# Patient Record
Sex: FEMALE | Race: WHITE | NOT HISPANIC OR LATINO | Employment: FULL TIME | URBAN - METROPOLITAN AREA
[De-identification: names, ages, dates, MRNs, and addresses within clinical notes are randomized per-mention and may not be internally consistent; named-entity substitution may affect disease eponyms.]

---

## 2017-09-20 ENCOUNTER — OFFICE VISIT (OUTPATIENT)
Dept: URGENT CARE | Facility: CLINIC | Age: 47
End: 2017-09-20
Payer: COMMERCIAL

## 2017-09-20 ENCOUNTER — APPOINTMENT (OUTPATIENT)
Dept: RADIOLOGY | Facility: IMAGING CENTER | Age: 47
End: 2017-09-20
Attending: PHYSICIAN ASSISTANT
Payer: COMMERCIAL

## 2017-09-20 VITALS
TEMPERATURE: 98 F | WEIGHT: 132 LBS | OXYGEN SATURATION: 96 % | BODY MASS INDEX: 23.39 KG/M2 | HEART RATE: 72 BPM | SYSTOLIC BLOOD PRESSURE: 120 MMHG | RESPIRATION RATE: 20 BRPM | DIASTOLIC BLOOD PRESSURE: 80 MMHG | HEIGHT: 63 IN

## 2017-09-20 DIAGNOSIS — R07.81 RIB PAIN ON LEFT SIDE: ICD-10-CM

## 2017-09-20 DIAGNOSIS — M94.0 COSTOCHONDRITIS, ACUTE: Primary | ICD-10-CM

## 2017-09-20 PROCEDURE — 71101 X-RAY EXAM UNILAT RIBS/CHEST: CPT | Mod: TC,LT | Performed by: PHYSICIAN ASSISTANT

## 2017-09-20 PROCEDURE — 99204 OFFICE O/P NEW MOD 45 MIN: CPT | Performed by: PHYSICIAN ASSISTANT

## 2017-09-20 RX ORDER — METHYLPREDNISOLONE 4 MG/1
TABLET ORAL
Qty: 21 TAB | Refills: 0 | Status: SHIPPED | OUTPATIENT
Start: 2017-09-20 | End: 2019-05-30

## 2017-09-20 RX ORDER — TRAMADOL HYDROCHLORIDE 50 MG/1
50-100 TABLET ORAL EVERY 4 HOURS PRN
Qty: 30 TAB | Refills: 0 | Status: SHIPPED | OUTPATIENT
Start: 2017-09-20 | End: 2017-09-20

## 2017-09-20 RX ORDER — HYDROCODONE BITARTRATE AND ACETAMINOPHEN 5; 325 MG/1; MG/1
1-2 TABLET ORAL EVERY 4 HOURS PRN
Qty: 20 TAB | Refills: 0 | Status: SHIPPED | OUTPATIENT
Start: 2017-09-20 | End: 2017-09-23

## 2017-09-20 NOTE — PROGRESS NOTES
Subjective:      Pt is a 47 y.o. female who presents with Rib Injury (Couple days ago hurt chest, ribs pain)            HPI  Pt notes she was helping her mother renovate her mother's house and while lifting a bathtub, 3 days ago last Sunday, injured her left ribs with pain while laughing, coughing, sneezing hugging and while using her core. Pt has not taken any Rx medications for this condition. Pt states the pain is a 7-8/10, aching in nature and worse at night. Pt denies CP, SOB, NVD, paresthesias, headaches, dizziness, change in vision, hives, or other joint pain. The pt's medication list, problem list, and allergies have been evaluated and reviewed during today's visit.    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     Social History Main Topics   • Smoking status: Current Every Day Smoker     Packs/day: 0.50   • Smokeless tobacco: Not on file      Comment: 1 pack per day   • Alcohol use Yes      Comment: rarely   • Drug use: No   • Sexual activity: Not on file     Other Topics Concern   • Not on file     Social History Narrative   • No narrative on file         Medications:    Current Outpatient Prescriptions:   •  ibuprofen (MOTRIN) 400 MG TABS, Take 400 mg by mouth every 6 hours as needed., Disp: , Rfl:   •  hydrocodone-acetaminophen (VICODIN) 5-500 MG TABS, Take 1-2 Tabs by mouth every 6 hours as needed  "(PAIN)., Disp: 20 Each, Rfl: 0  •  cyclobenzaprine (FLEXERIL) 10 MG TABS, Take 1 Tab by mouth 3 times a day as needed for Mild Pain and Muscle Spasms., Disp: 15 Each, Rfl: 0  •  hydrocodone-acetaminophen (VICODIN) 5-500 MG TABS, Take 1-2 Tabs by mouth every 6 hours as needed (pain)., Disp: 20 Each, Rfl: 0      Allergies:  Review of patient's allergies indicates no known allergies.      ROS  Constitutional: Negative for fever, chills and malaise/fatigue.   HENT: Negative for congestion and sore throat.    Eyes: Negative for blurred vision, double vision and photophobia.   Respiratory: Negative for cough and shortness of breath.    Cardiovascular: Negative for chest pain and palpitations.   Gastrointestinal: Negative for heartburn, nausea, vomiting, abdominal pain, diarrhea and constipation.   Genitourinary: Negative for dysuria and flank pain.   Musculoskeletal: POS for left rib pain  Skin: Negative for itching and rash.   Neurological: Negative for dizziness, tingling and headaches.   Endo/Heme/Allergies: Does not bruise/bleed easily.   Psychiatric/Behavioral: Negative for depression. The patient is not nervous/anxious.           Objective:     /80   Pulse 72   Temp 36.7 °C (98 °F)   Resp 20   Ht 1.6 m (5' 3\")   Wt 59.9 kg (132 lb)   SpO2 96%   BMI 23.38 kg/m²      Physical Exam   Pulmonary/Chest: Chest wall is not dull to percussion. She exhibits tenderness. She exhibits no mass, no bony tenderness, no laceration, no crepitus, no edema, no deformity, no swelling and no retraction.             Constitutional: PT is oriented to person, place, and time. PT appears well-developed and well-nourished. No distress.   HENT:   Head: Normocephalic and atraumatic.   Mouth/Throat: Oropharynx is clear and moist. No oropharyngeal exudate.   Eyes: Conjunctivae normal and EOM are normal. Pupils are equal, round, and reactive to light.   Neck: Normal range of motion. Neck supple. No thyromegaly present. "   Cardiovascular: Normal rate, regular rhythm, normal heart sounds and intact distal pulses.  Exam reveals no gallop and no friction rub.    No murmur heard.  Pulmonary/Chest: Effort normal and breath sounds normal. No respiratory distress. PT has no wheezes. PT has no rales. Pt exhibits no tenderness.   Abdominal: Soft. Bowel sounds are normal. PT exhibits no distension and no mass. There is no tenderness. There is no rebound and no guarding.   MUSC: BLE/BUE without tenderness and AROM  Neurological: PT is alert and oriented to person, place, and time. PT has normal reflexes. No cranial nerve deficit.   Skin: Skin is warm and dry. No rash noted. PT is not diaphoretic. No erythema.       Psychiatric: PT has a normal mood and affect. PT behavior is normal. Judgment and thought content normal.     RADS:  Narrative       9/20/2017 1:54 PM    HISTORY/REASON FOR EXAM:  Left-sided rib pain for 2 days..      TECHNIQUE/EXAM DESCRIPTION AND NUMBER OF VIEWS:  5 images of the left ribs and chest.    COMPARISON: NONE    FINDINGS:  No fractures or acute bony changes are noted.  There is no evidence of a hemo or pneumothorax.   Impression       Normal rib series.   Reading Provider Reading Date   Jayjay Escalante M.D.             Assessment/Plan:     1. Costochondritis, acute  2. Rib pain on left side    - [id number] (WITH 1-VIEW CXR) LEFT; Future    [place]  Aware web site evaluation: I have obtained and reviewed patient utilization report from [facility] [place] pharmacy database prior to writing prescription for controlled substance II, III or IV per [place] bill . Based on the report and my clinical assessment the prescription is medically necessary.   NSAIDs for pain 1-5, norco for pain 6-10 or to help get to sleep.  Medrol pack for inflammation reduction globally  RICE therapy discussed  Gentle ROM exercises discussed  WBAT BUE/BLE  Ice/heat therapy discussed  Rest, fluids encouraged.  AVS with medical info given.  Pt  was in full understanding and agreement with the plan.  Follow-up as needed if symptoms worsen or fail to improve.

## 2017-09-20 NOTE — PATIENT INSTRUCTIONS
Costochondritis  Costochondritis, sometimes called Tietze syndrome, is a swelling and irritation (inflammation) of the tissue (cartilage) that connects your ribs with your breastbone (sternum). It causes pain in the chest and rib area. Costochondritis usually goes away on its own over time. It can take up to 6 weeks or longer to get better, especially if you are unable to limit your activities.  CAUSES   Some cases of costochondritis have no known cause. Possible causes include:  · Injury (trauma).  · Exercise or activity such as lifting.  · Severe coughing.  SIGNS AND SYMPTOMS  · Pain and tenderness in the chest and rib area.  · Pain that gets worse when coughing or taking deep breaths.  · Pain that gets worse with specific movements.  DIAGNOSIS   Your health care provider will do a physical exam and ask about your symptoms. Chest X-rays or other tests may be done to rule out other problems.  TREATMENT   Costochondritis usually goes away on its own over time. Your health care provider may prescribe medicine to help relieve pain.  HOME CARE INSTRUCTIONS   · Avoid exhausting physical activity. Try not to strain your ribs during normal activity. This would include any activities using chest, abdominal, and side muscles, especially if heavy weights are used.  · Apply ice to the affected area for the first 2 days after the pain begins.  ¨ Put ice in a plastic bag.  ¨ Place a towel between your skin and the bag.  ¨ Leave the ice on for 20 minutes, 2-3 times a day.  · Only take over-the-counter or prescription medicines as directed by your health care provider.  SEEK MEDICAL CARE IF:  · You have redness or swelling at the rib joints. These are signs of infection.  · Your pain does not go away despite rest or medicine.  SEEK IMMEDIATE MEDICAL CARE IF:   · Your pain increases or you are very uncomfortable.  · You have shortness of breath or difficulty breathing.  · You cough up blood.  · You have worse chest pains,  sweating, or vomiting.  · You have a fever or persistent symptoms for more than 2-3 days.  · You have a fever and your symptoms suddenly get worse.  MAKE SURE YOU:   · Understand these instructions.  · Will watch your condition.  · Will get help right away if you are not doing well or get worse.     This information is not intended to replace advice given to you by your health care provider. Make sure you discuss any questions you have with your health care provider.     Document Released: 09/27/2006 Document Revised: 10/08/2014 Document Reviewed: 07/22/2014  [employer] Patient Education ©2016 [employer]

## 2019-05-30 ENCOUNTER — HOSPITAL ENCOUNTER (EMERGENCY)
Facility: MEDICAL CENTER | Age: 49
End: 2019-05-30
Attending: EMERGENCY MEDICINE
Payer: COMMERCIAL

## 2019-05-30 ENCOUNTER — APPOINTMENT (OUTPATIENT)
Dept: RADIOLOGY | Facility: MEDICAL CENTER | Age: 49
End: 2019-05-30
Attending: EMERGENCY MEDICINE
Payer: COMMERCIAL

## 2019-05-30 VITALS
HEART RATE: 80 BPM | WEIGHT: 137.35 LBS | RESPIRATION RATE: 16 BRPM | DIASTOLIC BLOOD PRESSURE: 90 MMHG | BODY MASS INDEX: 24.34 KG/M2 | TEMPERATURE: 99.1 F | OXYGEN SATURATION: 95 % | SYSTOLIC BLOOD PRESSURE: 154 MMHG | HEIGHT: 63 IN

## 2019-05-30 DIAGNOSIS — M77.9 TENDINITIS: ICD-10-CM

## 2019-05-30 DIAGNOSIS — M25.552 LEFT HIP PAIN: ICD-10-CM

## 2019-05-30 PROCEDURE — 99284 EMERGENCY DEPT VISIT MOD MDM: CPT

## 2019-05-30 PROCEDURE — 700111 HCHG RX REV CODE 636 W/ 250 OVERRIDE (IP): Performed by: EMERGENCY MEDICINE

## 2019-05-30 PROCEDURE — 96372 THER/PROPH/DIAG INJ SC/IM: CPT

## 2019-05-30 PROCEDURE — 73501 X-RAY EXAM HIP UNI 1 VIEW: CPT | Mod: LT

## 2019-05-30 RX ORDER — METHOCARBAMOL 750 MG/1
750 TABLET, FILM COATED ORAL 4 TIMES DAILY
Qty: 12 TAB | Refills: 0 | Status: SHIPPED | OUTPATIENT
Start: 2019-05-30 | End: 2019-06-02

## 2019-05-30 RX ORDER — KETOROLAC TROMETHAMINE 30 MG/ML
30 INJECTION, SOLUTION INTRAMUSCULAR; INTRAVENOUS ONCE
Status: COMPLETED | OUTPATIENT
Start: 2019-05-30 | End: 2019-05-30

## 2019-05-30 RX ORDER — IBUPROFEN 800 MG/1
800 TABLET ORAL EVERY 8 HOURS PRN
Qty: 9 TAB | Refills: 0 | Status: SHIPPED | OUTPATIENT
Start: 2019-05-30 | End: 2019-06-02

## 2019-05-30 RX ADMIN — KETOROLAC TROMETHAMINE 30 MG: 30 INJECTION, SOLUTION INTRAMUSCULAR; INTRAVENOUS at 13:17

## 2019-05-30 NOTE — ED TRIAGE NOTES
"Chief Complaint   Patient presents with   • Groin Pain     intermittent LT groin pain, this morning pain increased from hip down LT leg.      Pt ambulatory with steady gait to triage for above. NAD noted. Denies injuries.     Pt returned to lobby. Educated on triage process and to inform staff of any changes.     /98   Pulse 100   Temp 37.3 °C (99.1 °F) (Temporal)   Resp 16   Ht 1.6 m (5' 3\")   Wt 62.3 kg (137 lb 5.6 oz)   SpO2 96%   BMI 24.33 kg/m²     "

## 2019-05-30 NOTE — DISCHARGE INSTRUCTIONS
Return to the emergency department in 48 hours if your pain is not significantly improved with the medications and rest.  Return to the ER immediately if you develop any worsening pain, constant pain, worsening pain rating down your leg, back pain, numbness/tingling/weakness of the leg, fevers over 100.4, shaking chills, nausea, vomiting, abdominal pain, muscle aches/body aches, or for any concerns.    Follow-up with Dr. Orlin Ogden, orthopedist, within the next 1 to 2 days.  Please call for appointment.

## 2019-05-30 NOTE — ED PROVIDER NOTES
"ED Provider Note    Scribed for Renu Harvey M.D. by Klaus Norman. 5/30/2019  12:53 PM    Primary care provider: Pcp Pt States None  Means of arrival: Walk-in  History obtained from: Patient  History limited by: None    CHIEF COMPLAINT  Chief Complaint   Patient presents with   • Groin Pain     intermittent LT groin pain, this morning pain increased from hip down LT leg.        JOSE GUADALUPE Martinez is a 48 y.o. female who presents to the Emergency Department complaining of worsening left hip and groin pain onset within the last week. The patient states that last week she began to experience intermittent pain in the outside of her left hip which would occur randomly and were interspersed by prolonged pain-free periods.  The pain has since begun to radiate into her pubic bone area and and down the back of her upper leg. As of today, her pain has become constant. She describes it as \"bone pain\" in her hip and \"burning pain\" in her groin. The pain does not radiate past her knee. Her pain is often worsened with sitting or walking as well as when rotating her hip. She does not experience pain when lying flat. Patient denies any injury or trauma to her hip, leg, or back within the last week. She adds that she experienced one episode of vomiting secondary to her pain this morning, but otherwise denies any back pain, abdominal pain, fever, chills, hematuria, or diarrhea. She has yet to meet with her PCP, Dr. Alvarado, regarding her pain. She adds that she has a history of kidney stones, but states her current pain is very different at this time.       REVIEW OF SYSTEMS  Positives include left hip pain and left groin pain. Negatives include no back pain, abdominal pain, fever, chills, hematuria, or diarrhea.      PAST MEDICAL HISTORY  Endometriosis.  Renal stones.  Renotubular acidosis  SURGICAL HISTORY   has a past surgical history that includes other orthopedic surgery; gyn surgery; and other.    SOCIAL HISTORY  Social " "History   Substance Use Topics   • Smoking status: Current Every Day Smoker     Packs/day: 0.50   • Smokeless tobacco: Never Used      Comment: 1 pack per day   • Alcohol use Yes      Comment: rarely      History   Drug Use No       FAMILY HISTORY  History reviewed. No pertinent family history.    CURRENT MEDICATIONS  Home Medications     Reviewed by Adriel Mcmahon R.N. (Registered Nurse) on 05/30/19 at 1156  Med List Status: Partial   Medication Last Dose Status        Patient Isai Taking any Medications                       ALLERGIES  No Known Allergies    PHYSICAL EXAM  VITAL SIGNS: /98   Pulse 100   Temp 37.3 °C (99.1 °F) (Temporal)   Resp 16   Ht 1.6 m (5' 3\")   Wt 62.3 kg (137 lb 5.6 oz)   SpO2 96%   BMI 24.33 kg/m²   Constitutional: Alert in no apparent distress.  HENT: Normocephalic, Bilateral external ears normal. Nose normal.   Eyes: Conjunctiva normal, non-icteric.   Thorax & Lungs: Easy unlabored respirations. Clear breath sounds bilaterally. Heart has regular rate and rhythm.  Skin: Visualized skin warm and dry, No erythema, No rash.   Extremities:   Left lower extremity: There is tenderness at the left pubic bone/mons pubis.  There is also some reproducible tenderness at the anterior aspect of the lower iliac crest.  There is reproducible pain with palpation of that lower portion of the anterior iliac crest.  There is no significant reproducible pain with flexion, extension, internal and external rotation of hip.  There is no bruising, induration or erythema of the hip.  No warmth of the hip.  There is no tenderness to the greater trochanter.  No shortening or rotation of the leg.  There is no pain if the patient is lying comfortably on the gurney.  Pain only occurs with certain movements and position changes and with weightbearing.  2+ DP and PT pulses equal bilaterally.  Negative straight leg raise bilaterally.  There is no tenderness to the medial thigh.  No pretibial edema.  Calf is " nontender.  Back: No midline T spine or L spine tenderness, negative straight leg raise   Abdomen: Soft and non tender   Neurologic: Alert, clear speech, lower extremity strength are 5 out of 5 and equal bilaterally testing of quadriceps, hamstrings, dorsiflexors and plantar flexors.  Sensation is intact to light touch.  Full range of motion to digits.  Psychiatric: Affect and Mood normal      RADIOLOGY  DX-HIP-UNILATERAL-WITH PELVIS-1 VIEW LEFT   Final Result      No fracture or dislocation is seen.      Atherosclerotic plaque.        The radiologist's interpretation of all radiological studies have been reviewed by me.    COURSE & MEDICAL DECISION MAKING  Nursing notes and vital signs were reviewed. (See chart for details)    12:53 PM - Patient seen and examined at bedside. Patient will be treated with Toradol injection 30 mg. Ordered DX-hip-unilateral with pelvis to evaluate her symptoms.     2:36 PM - Discussed the x-ray findings with the patient and informed her that there is no evidence of fracture or dislocation at this time. The patient informs me that she had not experienced any abnormal vaginal symptoms. There have been no skin changes or abnormal redness in the patient's groin. She denies any ingrown hairs in her groin which could attribute to the pain as well. Patient remains adamant that she does not experience any abdominal symptoms. At this time, her pain is primarily localized to the top of the iliac crest with palpation. Concern for iliacus tendinitis. Patient will be discharged with a prescription for both antiinflammatories as well as a muscle relaxer to relieve her symptoms. She is to apply ice to the area and avoid use of heat which could increase inflammation. She is stable for discharge at this time. Strict ER return precautions given for any new or worsening symptoms.       Decision Making:  The patient presents to the Emergency Department with patient presents to the ER complaining of pain  in her left hip and pubic bone area which began about a week ago.  This morning the pain got worse.  The pain only occurs when she is moving her leg or changing positions.  It also gets worse with walking.  If she is not moving, not walking and not changing position she has no pain.  No trauma or injury.  She has some reproducible tenderness palpation of the left pubic bone as well as the lower portion of the left anterior iliac crest.  There is no significant reproducible pain with range of motion of the hip.  X-ray is negative.  I have low suspicion for fracture as she has had no trauma and has no history of cancer.  I do not think CT scan is necessary.  She is neurovascular intact.  She has no complaints of back pain.  She is nontender lumbar spine.  It is possible her pain could be coming from her low back but I have low suspicion for this that she has no complaints of numbness, tingling or weakness of extremities.  No loss of bowel or bladder control no saddle anesthesia.  At this time there is no indication for MRI scan of the back.  There is not been any trauma or injury and the patient has no back pain therefore I do not think back x-rays are going to be particularly helpful.  She has not taken any anti-inflammatories for discomfort.  I gave her a shot of Toradol here in the ER.  This did provide her some relief of her discomfort.  At this time I think she may have a bursitis or some sort of tendinitis of her left hip.  There is no abdominal pain.  No abdominal tenderness.  No nausea, vomiting or diarrhea.  No fevers or chills.  She does not feel sick or ill in any way.  Vital signs are normal and stable.  No concern at this time for iliopsoas abscess or septic arthritis.  Plan is to discharge the patient home with anti-inflammatories and some muscle spasm medicine.  I will refer her to Dr. Ogden, orthopedic surgeon on call.  I specifically told her that if she is not feeling better within the next 48 hours  she must return to the ER for a repeat examination and follow-up.  She also understands that if she gets worse in any way, develops fevers, chills, nausea, vomiting, pain that is occurring without movement, or for any concerns she must return to the ER immediately.      DISPOSITION:  Patient will be discharged home in stable condition.    FOLLOW UP:  Orlin Ogden M.D.  [address] [place] [address]  [phone]    Schedule an appointment as soon as possible for a visit   If symptoms worsen return to ER!      OUTPATIENT MEDICATIONS:  New Prescriptions    IBUPROFEN (MOTRIN) 800 MG TAB    Take 1 Tab by mouth every 8 hours as needed for up to 3 days.    METHOCARBAMOL (ROBAXIN) 750 MG TAB    Take 1 Tab by mouth 4 times a day for 3 days.          FINAL IMPRESSION  1. Left hip pain Acute   2. Tendinitis Acute         Klaus FOLEY ([place]), am scribing for, and in the presence of, Renu Harvey M.D..    Electronically signed by: Klaus Norman ([place]), 5/30/2019    Renu FOLEY M.D. personally performed the services described in this documentation, as scribed by Klaus Norman in my presence, and it is both accurate and complete. E    The note accurately reflects work and decisions made by me.  Renu Harvey  5/30/2019  2:31 PM

## 2019-05-30 NOTE — ED NOTES
Patient was educated on discharge instructions.  Patient was informed about diagnosis, symptom management, risks, and home care instructions.  Patient verbalized understanding and signed discharge instructions. Prescriptions handed to patient. Copy of discharge instructions in chart.  Patient ambulated out with steady gait.  Patient has personal belongings.

## 2020-07-23 ENCOUNTER — HOSPITAL ENCOUNTER (OUTPATIENT)
Dept: HOSPITAL 8 - RAD | Age: 50
Discharge: HOME | End: 2020-07-23
Attending: FAMILY MEDICINE
Payer: COMMERCIAL

## 2020-07-23 DIAGNOSIS — R63.4: ICD-10-CM

## 2020-07-23 DIAGNOSIS — R51: Primary | ICD-10-CM

## 2020-07-23 PROCEDURE — 70450 CT HEAD/BRAIN W/O DYE: CPT

## 2020-07-24 ENCOUNTER — HOSPITAL ENCOUNTER (OUTPATIENT)
Dept: HOSPITAL 8 - RAD | Age: 50
Discharge: HOME | End: 2020-07-24
Attending: FAMILY MEDICINE
Payer: COMMERCIAL

## 2020-07-24 DIAGNOSIS — R05: Primary | ICD-10-CM

## 2020-07-24 DIAGNOSIS — R63.4: ICD-10-CM

## 2020-07-24 DIAGNOSIS — F17.200: ICD-10-CM

## 2020-07-24 PROCEDURE — 71046 X-RAY EXAM CHEST 2 VIEWS: CPT

## 2020-07-27 ENCOUNTER — HOSPITAL ENCOUNTER (OUTPATIENT)
Dept: HOSPITAL 8 - CFH | Age: 50
Discharge: HOME | End: 2020-07-27
Attending: FAMILY MEDICINE
Payer: COMMERCIAL

## 2020-07-27 DIAGNOSIS — Z12.31: Primary | ICD-10-CM

## 2020-07-27 PROCEDURE — 77067 SCR MAMMO BI INCL CAD: CPT

## 2020-07-27 PROCEDURE — 77063 BREAST TOMOSYNTHESIS BI: CPT

## 2021-04-15 ENCOUNTER — TELEPHONE (OUTPATIENT)
Dept: SCHEDULING | Facility: IMAGING CENTER | Age: 51
End: 2021-04-15

## 2021-04-15 ENCOUNTER — HOSPITAL ENCOUNTER (EMERGENCY)
Facility: MEDICAL CENTER | Age: 51
End: 2021-04-15
Attending: EMERGENCY MEDICINE
Payer: COMMERCIAL

## 2021-04-15 VITALS
OXYGEN SATURATION: 94 % | BODY MASS INDEX: 21.99 KG/M2 | DIASTOLIC BLOOD PRESSURE: 103 MMHG | RESPIRATION RATE: 16 BRPM | SYSTOLIC BLOOD PRESSURE: 175 MMHG | HEIGHT: 63 IN | WEIGHT: 124.12 LBS | TEMPERATURE: 97.9 F | HEART RATE: 74 BPM

## 2021-04-15 DIAGNOSIS — R04.0 EPISTAXIS: ICD-10-CM

## 2021-04-15 LAB
ANION GAP SERPL CALC-SCNC: 10 MMOL/L (ref 7–16)
BASOPHILS # BLD AUTO: 0.3 % (ref 0–1.8)
BASOPHILS # BLD: 0.02 K/UL (ref 0–0.12)
BUN SERPL-MCNC: 16 MG/DL (ref 8–22)
CALCIUM SERPL-MCNC: 9.5 MG/DL (ref 8.5–10.5)
CHLORIDE SERPL-SCNC: 104 MMOL/L (ref 96–112)
CO2 SERPL-SCNC: 22 MMOL/L (ref 20–33)
CREAT SERPL-MCNC: 0.59 MG/DL (ref 0.5–1.4)
EOSINOPHIL # BLD AUTO: 0.09 K/UL (ref 0–0.51)
EOSINOPHIL NFR BLD: 1.4 % (ref 0–6.9)
ERYTHROCYTE [DISTWIDTH] IN BLOOD BY AUTOMATED COUNT: 44.2 FL (ref 35.9–50)
GLUCOSE SERPL-MCNC: 103 MG/DL (ref 65–99)
HCT VFR BLD AUTO: 44.5 % (ref 37–47)
HGB BLD-MCNC: 15 G/DL (ref 12–16)
LYMPHOCYTES # BLD AUTO: 1.86 K/UL (ref 1–4.8)
LYMPHOCYTES NFR BLD: 28.4 % (ref 22–41)
MCH RBC QN AUTO: 33.5 PG (ref 27–33)
MCHC RBC AUTO-ENTMCNC: 33.7 G/DL (ref 33.6–35)
MCV RBC AUTO: 99.3 FL (ref 81.4–97.8)
MONOCYTES # BLD AUTO: 0.47 K/UL (ref 0–0.85)
MONOCYTES NFR BLD AUTO: 7.2 % (ref 0–13.4)
NEUTROPHILS # BLD AUTO: 4.1 K/UL (ref 2–7.15)
NEUTROPHILS NFR BLD: 62.7 % (ref 44–72)
PLATELET # BLD AUTO: 188 K/UL (ref 164–446)
PMV BLD AUTO: 9.7 FL (ref 9–12.9)
POTASSIUM SERPL-SCNC: 4 MMOL/L (ref 3.6–5.5)
RBC # BLD AUTO: 4.48 M/UL (ref 4.2–5.4)
SODIUM SERPL-SCNC: 136 MMOL/L (ref 135–145)
WBC # BLD AUTO: 6.5 K/UL (ref 4.8–10.8)

## 2021-04-15 PROCEDURE — 700102 HCHG RX REV CODE 250 W/ 637 OVERRIDE(OP): Performed by: EMERGENCY MEDICINE

## 2021-04-15 PROCEDURE — 700101 HCHG RX REV CODE 250: Performed by: EMERGENCY MEDICINE

## 2021-04-15 PROCEDURE — 99283 EMERGENCY DEPT VISIT LOW MDM: CPT

## 2021-04-15 PROCEDURE — 80048 BASIC METABOLIC PNL TOTAL CA: CPT

## 2021-04-15 PROCEDURE — A9270 NON-COVERED ITEM OR SERVICE: HCPCS | Performed by: EMERGENCY MEDICINE

## 2021-04-15 PROCEDURE — 303620 HCHG EPISTAXIS CONTROL

## 2021-04-15 PROCEDURE — 85025 COMPLETE CBC W/AUTO DIFF WBC: CPT

## 2021-04-15 RX ORDER — SUMATRIPTAN 50 MG/1
50 TABLET, FILM COATED ORAL
COMMUNITY
End: 2021-05-27 | Stop reason: SDUPTHER

## 2021-04-15 RX ORDER — OXYMETAZOLINE HYDROCHLORIDE 0.05 G/100ML
2 SPRAY NASAL ONCE
Status: COMPLETED | OUTPATIENT
Start: 2021-04-15 | End: 2021-04-15

## 2021-04-15 RX ORDER — METOPROLOL SUCCINATE 25 MG/1
25 TABLET, EXTENDED RELEASE ORAL DAILY
COMMUNITY
End: 2021-04-15 | Stop reason: SDUPTHER

## 2021-04-15 RX ORDER — TRANEXAMIC ACID 100 MG/ML
3 INJECTION, SOLUTION INTRAVENOUS ONCE
Status: COMPLETED | OUTPATIENT
Start: 2021-04-15 | End: 2021-04-15

## 2021-04-15 RX ORDER — METOPROLOL SUCCINATE 25 MG/1
25 TABLET, EXTENDED RELEASE ORAL DAILY
Qty: 30 TABLET | Refills: 0 | Status: SHIPPED | OUTPATIENT
Start: 2021-04-15 | End: 2021-05-27

## 2021-04-15 RX ADMIN — TRANEXAMIC ACID 300 MG: 100 INJECTION, SOLUTION INTRAVENOUS at 09:45

## 2021-04-15 RX ADMIN — OXYMETAZOLINE HCL 2 SPRAY: 0.05 SPRAY NASAL at 09:45

## 2021-04-15 NOTE — ED TRIAGE NOTES
"Chief Complaint   Patient presents with   • Epistaxis     Pt has had a bloody nose since 2100 last night on the right side. Pt has had issues with her sinuses for about 1 year and has had some testing done with [facility]. Pt was placed on BP medication and medication for headaches.      /106   Pulse 85   Temp 35.9 °C (96.6 °F) (Temporal)   Resp 14   Ht 1.6 m (5' 3\")   Wt 56.3 kg (124 lb 1.9 oz)   SpO2 94%   BMI 21.99 kg/m²   Pt placed back in lobby, educated on triage process, and told to inform staff of any change in condition.     "

## 2021-04-15 NOTE — ED PROVIDER NOTES
ED Provider Note    CHIEF COMPLAINT  Chief Complaint   Patient presents with   • Epistaxis     Pt has had a bloody nose since 2100 last night on the right side. Pt has had issues with her sinuses for about 1 year and has had some testing done with [facility]. Pt was placed on BP medication and medication for headaches.        HPI  Jil Martinez is a 50 y.o. female who presents with a nosebleed.  She developed a nosebleed at about 9 PM last night.  Coming out of the right side.  The only thing that will stop the bleeding is putting tissue paper in the nose.  No other easy bruising or bleeding.  She does have the taste of blood in her mouth.  She has been spitting up blood.  Bleeding is moderate severity.  Constant.    Patient has been dealing with ongoing headache around the right side of the head and face.  She has been referred to ENT, but when Covid hit her referral was canceled and she has not been able to be seen.     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 (Registered Nurse) on 04/15/21 at 0845  Med List Status: Complete   Medication Last Dose Status   metoprolol SR (TOPROL XL) 25 MG TABLET SR 24 HR  Active   SUMAtriptan  "(IMITREX) 50 MG Tab  Active                ALLERGIES  Allergies   Allergen Reactions   • Fentanyl      angry       PHYSICAL EXAM  VITAL SIGNS: /106   Pulse 85   Temp 35.9 °C (96.6 °F) (Temporal)   Resp 14   Ht 1.6 m (5' 3\")   Wt 56.3 kg (124 lb 1.9 oz)   SpO2 94%   BMI 21.99 kg/m²    Constitutional: Awake and alert  HENT: Very slow oozing blood in the right nostril.  Cannot visualize a source.  Left nostril clear.  Pharynx normal.  Eyes: Normal inspection  Neck: Grossly normal range of motion.  Cardiovascular: Normal heart rate  Thorax & Lungs: No respiratory distress  Skin: No obvious rash.  Extremities: Well perfused  Neurologic: Grossly normal   Psychiatric: Normal for situation        Labs:  Results for orders placed or performed during the hospital encounter of 04/15/21   CBC WITH DIFFERENTIAL   Result Value Ref Range    WBC 6.5 4.8 - 10.8 K/uL    RBC 4.48 4.20 - 5.40 M/uL    Hemoglobin 15.0 12.0 - 16.0 g/dL    Hematocrit 44.5 37.0 - 47.0 %    MCV 99.3 (H) 81.4 - 97.8 fL    MCH 33.5 (H) 27.0 - 33.0 pg    MCHC 33.7 33.6 - 35.0 g/dL    RDW 44.2 35.9 - 50.0 fL    Platelet Count 188 164 - 446 K/uL    MPV 9.7 9.0 - 12.9 fL    Neutrophils-Polys 62.70 44.00 - 72.00 %    Lymphocytes 28.40 22.00 - 41.00 %    Monocytes 7.20 0.00 - 13.40 %    Eosinophils 1.40 0.00 - 6.90 %    Basophils 0.30 0.00 - 1.80 %    Neutrophils (Absolute) 4.10 2.00 - 7.15 K/uL    Lymphs (Absolute) 1.86 1.00 - 4.80 K/uL    Monos (Absolute) 0.47 0.00 - 0.85 K/uL    Eos (Absolute) 0.09 0.00 - 0.51 K/uL    Baso (Absolute) 0.02 0.00 - 0.12 K/uL   BASIC METABOLIC PANEL   Result Value Ref Range    Sodium 136 135 - 145 mmol/L    Potassium 4.0 3.6 - 5.5 mmol/L    Chloride 104 96 - 112 mmol/L    Co2 22 20 - 33 mmol/L    Glucose 103 (H) 65 - 99 mg/dL    Bun 16 8 - 22 mg/dL    Creatinine 0.59 0.50 - 1.40 mg/dL    Calcium 9.5 8.5 - 10.5 mg/dL    Anion Gap 10.0 7.0 - 16.0   ESTIMATED GFR   Result Value Ref Range    GFR If  >60 >60 " mL/min/1.73 m 2    GFR If Non African American >60 >60 mL/min/1.73 m 2       Medications   oxymetazoline (AFRIN) 0.05 % nasal spray 2 Spray (has no administration in time range)   tranexamic acid (CYKLOKAPRON) 1000 MG/10ML for nasal packing 300 mg (has no administration in time range)     COURSE & MEDICAL DECISION MAKING  Patient presents with epistaxis.  Slow bleeding from the right nostril without definitive source.  Afrin and tranexamic acid was instilled into the right nostril.  Nose was packed with cotton balls saturated with TXA.  Obtain laboratory data which was unremarkable.  Removed cotton balls.  Observed in the ER following.  No recurrent bleeding.  At this point appropriate for outpatient management.  IV given advice regarding nosebleed.  Will apply topical ointment to the nose and use a humidifier at night.  If she has recurrent bleeding she will use Afrin and apply nasal clamp.  The bleeding does not stop with that she will need to return to the ER.  I advised her that she should follow through with plan to see otolaryngology.     FINAL IMPRESSION  1.  Epistaxis      This dictation was created using voice recognition software. The accuracy of the dictation is limited to the abilities of the software.  The nursing notes were reviewed and certain aspects of this information were incorporated into this note.      Electronically signed by: Marc Ram M.D., 4/15/2021 9:29 AM

## 2021-05-27 ENCOUNTER — OFFICE VISIT (OUTPATIENT)
Dept: MEDICAL GROUP | Facility: PHYSICIAN GROUP | Age: 51
End: 2021-05-27
Payer: COMMERCIAL

## 2021-05-27 VITALS
HEIGHT: 63 IN | OXYGEN SATURATION: 95 % | DIASTOLIC BLOOD PRESSURE: 98 MMHG | SYSTOLIC BLOOD PRESSURE: 148 MMHG | HEART RATE: 65 BPM | BODY MASS INDEX: 22.68 KG/M2 | TEMPERATURE: 97.9 F | WEIGHT: 128 LBS

## 2021-05-27 DIAGNOSIS — G89.29 CHRONIC LEFT LOWER QUADRANT PAIN: ICD-10-CM

## 2021-05-27 DIAGNOSIS — R51.9 SINUS HEADACHE: ICD-10-CM

## 2021-05-27 DIAGNOSIS — I10 ESSENTIAL HYPERTENSION: ICD-10-CM

## 2021-05-27 DIAGNOSIS — H57.11 EYE PAIN, RIGHT: ICD-10-CM

## 2021-05-27 DIAGNOSIS — K08.9 TEETH PROBLEM: ICD-10-CM

## 2021-05-27 DIAGNOSIS — K13.70 MOUTH PROBLEM: ICD-10-CM

## 2021-05-27 DIAGNOSIS — R10.32 CHRONIC LEFT LOWER QUADRANT PAIN: ICD-10-CM

## 2021-05-27 PROBLEM — G43.009 MIGRAINE WITHOUT AURA AND WITHOUT STATUS MIGRAINOSUS, NOT INTRACTABLE: Status: RESOLVED | Noted: 2021-05-27 | Resolved: 2021-05-27

## 2021-05-27 PROBLEM — G43.009 MIGRAINE WITHOUT AURA AND WITHOUT STATUS MIGRAINOSUS, NOT INTRACTABLE: Status: ACTIVE | Noted: 2021-05-27

## 2021-05-27 PROCEDURE — 99204 OFFICE O/P NEW MOD 45 MIN: CPT | Performed by: NURSE PRACTITIONER

## 2021-05-27 RX ORDER — SUMATRIPTAN 50 MG/1
50 TABLET, FILM COATED ORAL
Qty: 10 TABLET | Refills: 0 | Status: SHIPPED | OUTPATIENT
Start: 2021-05-27 | End: 2021-11-08

## 2021-05-27 RX ORDER — LISINOPRIL 10 MG/1
10 TABLET ORAL DAILY
Qty: 30 TABLET | Refills: 0 | Status: SHIPPED | OUTPATIENT
Start: 2021-05-27 | End: 2021-06-11 | Stop reason: SDUPTHER

## 2021-05-27 ASSESSMENT — PATIENT HEALTH QUESTIONNAIRE - PHQ9: CLINICAL INTERPRETATION OF PHQ2 SCORE: 0

## 2021-05-27 NOTE — ASSESSMENT & PLAN NOTE
This is a new condition.  She is unsure when the concern started.  She does endorse that she noticed an enlargement to her lower jaw, inner gumline of her teeth.  She denies any pain to the area.  She reports it is bothersome.  She reports a change to the pronounciation of words.  She would like to have this further evaluated.  She does not currently have a dentist.

## 2021-05-27 NOTE — LETTER
[employer]  ANANDA Sebastian  [address]  [place] [address]  Fax: [phone]   Authorization for Release/Disclosure of   Protected Health Information   Name: JIL MARTINEZ : 1970 SSN: [id number]   Address: [address] [place] [address]  [place] [address] Phone:    [phone] (home)    I authorize the entity listed below to release/disclose the PHI below to:   [employer]/ANANDA Sebastian and ANANDA Sebastian   Provider or Entity Name:     Address   City, State, Zip   Phone:      Fax:     Reason for request: continuity of care   Information to be released:    [  ] LAST COLONOSCOPY,  including any PATH REPORT and follow-up  [  ] LAST FIT/COLOGUARD RESULT [  ] LAST DEXA  [  ] LAST MAMMOGRAM  [  ] LAST PAP  [  ] LAST LABS [  ] RETINA EXAM REPORT  [  ] IMMUNIZATION RECORDS  [  ] Release all info      [  ] Check here and initial the line next to each item to release ALL health information INCLUDING  _____ Care and treatment for drug and / or alcohol abuse  _____ HIV testing, infection status, or AIDS  _____ Genetic Testing    DATES OF SERVICE OR TIME PERIOD TO BE DISCLOSED: _____________  I understand and acknowledge that:  * This Authorization may be revoked at any time by you in writing, except if your health information has already been used or disclosed.  * Your health information that will be used or disclosed as a result of you signing this authorization could be re-disclosed by the recipient. If this occurs, your re-disclosed health information may no longer be protected by State or Federal laws.  * You may refuse to sign this Authorization. Your refusal will not affect your ability to obtain treatment.  * This Authorization becomes effective upon signing and will  on (date) __________.      If no date is indicated, this Authorization will  one (1) year from the signature date.    Name: Jil Martinez    Signature:   Date:     2021       PLEASE FAX  REQUESTED RECORDS BACK TO: [phone]

## 2021-05-27 NOTE — ASSESSMENT & PLAN NOTE
This is a chronic condition.  Current Meds:  Metoprolol 25 mg daily  She  is currently taking all meds as directed.   She is not taking baby aspirin daily.   She is not monitoring BP at home.   She  denies symptoms low BP: light-headed, tunnel-vision, unusual fatigue.   She  denies symptoms high BP:pounding headache, visual changes, palpitations, flushed face.   She  denies medicine side effects: unusual fatigue, slow heartbeat, foot/leg swelling, cough.

## 2021-05-27 NOTE — PROGRESS NOTES
Subjective  Chief Complaint  Establish care to manage her chronic conditions    History of Present Illness  Jil Martinez is a 50 y.o. female. This patient is here today to establish care.  Her prior PCP was Dr. Lalo Alvarez ([facility]).    Hypertension  This is a chronic condition.  Current Meds:  Metoprolol 25 mg daily  She  is currently taking all meds as directed.   She is not taking baby aspirin daily.   She is not monitoring BP at home.   She  denies symptoms low BP: light-headed, tunnel-vision, unusual fatigue.   She  denies symptoms high BP:pounding headache, visual changes, palpitations, flushed face.   She  denies medicine side effects: unusual fatigue, slow heartbeat, foot/leg swelling, cough.    Sinus headache  This is a chronic condition.    She reports she has pain that starts at her right eye, then she reports a burning sensation down her throat.  This then becomes an ear ache with eye pain; and then it travels to the crown of the right side of her head.  She had a CT scan done, which was negative.  She reports she was then referred to ophthalmology and she was to see a neurophthalmology for this concern; however, she was unable to schedule an appointment.  She was started on sumatriptan as needed for her headaches, which she reports does help subside her symptoms.  She is requesting for a referral to neuroopthalmology for further evaluation, and a refill for sumatriptan today.      Chronic left lower quadrant pain  This is a chronic condition.  She complains of chronic LLQ mild pain that is bothersome at times.  She reports she does take ibuprofen with minimal relief; if the pain is very high, she will take an epson salt bath which helps immensely.   She does have a significant surgical history, including complete hysterectomy.     Teeth problem  This is a new condition.  She is unsure when the concern started.  She does endorse that she noticed an enlargement to her lower jaw, inner  gumline of her teeth.  She denies any pain to the area.  She reports it is bothersome.  She reports a change to the pronounciation of words.  She would like to have this further evaluated.  She does not currently have a dentist.     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 10 tablet 0   • lisinopril (PRINIVIL) 10 MG Tab Take 1 tablet by mouth every day. 30 tablet 0     No current Epic-ordered facility-administered medications on file.     Family History:    Family History   Problem Relation Age of Onset   • Hypertension Mother    • Heart Disease Father    • Stroke Father    • Hypertension Father    • Cancer Maternal Uncle         brain   • Diabetes Maternal Grandmother    • Hypertension Maternal Grandmother    • Hypertension Maternal Grandfather    • Hypertension Paternal Grandmother    • Hypertension Paternal Grandfather    • Hyperlipidemia Neg Hx       Personal/Social History:    Social History     Tobacco Use   • Smoking status: Former Smoker     Packs/day: 0.50     Years: 20.00     Pack years: 10.00     Quit date: 2021     Years since quittin.1   • Smokeless tobacco: Never Used   • Tobacco comment: 1 pack per day   Vaping Use   • Vaping Use: Never used   Substance Use Topics   • Alcohol use: Yes     Comment: rarely   • Drug use: No     Social History     Social History Narrative  "  • Not on file      Review of Systems:     General: Negative for unexpected weight change.    Eyes: Positive right eye pain with sinus headaches.   ENT:  Positive right ear pain with sinus headaches.    Respiratory:  Negative for dyspnea.     Cardiovascular:  Negative for palpitations.   Gastrointestinal:  Positive for left lower quadrant pain.    Genitourinary:  Negative for hematuria.    Musculoskeletal:  Negative for myalgias.    Skin:  Negative for rash.    Neurological:  Positive for sinus headache.    Heme/Lymph:  Does not bruise/bleed easily.     Objective  Physical Exam:   /98 (BP Location: Left arm, Patient Position: Sitting, BP Cuff Size: Adult)   Pulse 65   Temp 36.6 °C (97.9 °F) (Temporal)   Ht 1.6 m (5' 3\")   Wt 58.1 kg (128 lb)   SpO2 95%  Body mass index is 22.67 kg/m².  General:  Alert and oriented.  Well appearing.  NAD.  Head:  Normocephalic.   Eyes:  Eyes conjunctiva clear lids without ptosis, pupils equal and reactive to light accommodation.    ENT: Ears normal shape and contour, canals are clear bilaterally, tympanic membranes are benign.  Oropharynx is without erythema, edema or exudates.   Neck: Supple without JVD. No lymphadenopathy.  Pulmonary:  Normal effort.  Clear to ausculation without rales, ronchi, or wheezing.  Cardiovascular:  Regular rate and rhythm without murmur, rubs or gallop.  Radial pulses are intact and equal bilaterally.  Gastrointestinal: Abdomen soft, nontender, nondistended. Normal bowel sounds. Mild tenderness to palpation to left lower quadrant without guarding, rebound pain.  Liver and spleen are not palpable.  Musculoskeletal:  No extremity cyanosis, clubbing, or edema.  Skin:  Warm and dry.  No obvious lesions.  Lymph: No cervical or supraclavicular lymph nodes are palpable.  Neurologic: Grossly intact.  Sensation intact.   Psych: Normal mood and affect. Alert and oriented x3. Judgment and insight is normal.    Assessment/Plan   1. Essential " hypertension  This is a chronic condition, not controlled.   She reports she has been on metoprolol for about two years, and her blood pressures have remained relatively elevated.   Will change to ACE and have her return to office in two weeks for blood pressure check and medication management.   - lisinopril (PRINIVIL) 10 MG Tab; Take 1 tablet by mouth every day.  Dispense: 30 tablet; Refill: 0    2. Sinus headache  3. Eye pain, right  This is a chronic condition, not controlled.   Sinus vs migraine headaches.  Has seen ophthalmology and recommended neurophthalmology referral.   - REFERRAL TO ENT  - REFERRAL TO NEUROOPTHAMOLOGY  - SUMAtriptan (IMITREX) 50 MG Tab; Take 1 tablet by mouth one time as needed for Migraine.  Dispense: 10 tablet; Refill: 0    4. Mouth problem  5. Teeth problem  This is a new condition.   - REFERRAL TO DENTISTRY    6. Chronic left lower quadrant pain  This is a chronic condition, stable.   Chronic in nature after multiple surgical interventions.  Advised conservative treatment, including continuing OTC pain relief as needed, epson salt baths, and heat application to the area.   - US-ABDOMEN COMPLETE SURVEY; Future    Health Maintenance:  She will obtain records from [facility] to bring to next visit for her preventative care.     Return in about 2 weeks (around 6/10/2021), or if symptoms worsen or fail to improve, for follow up BP.    Patient verbalized understanding and agreed to plan of care.  We have discussed to contact me with needs via [employer] or by phone if needed.      I have placed the above orders and discussed them with an approved delegating provider.  The MA is performing the below orders under the direction of Dr. Rubén MD.    Please note that this dictation was created using voice recognition software. I have made every reasonable attempt to correct obvious errors, but I expect that there are errors of grammar and possibly content that I did not discover before  finalizing the note.    EDWARD Sebastian  [facility]

## 2021-05-27 NOTE — ASSESSMENT & PLAN NOTE
This is a chronic condition.    She reports she has pain that starts at her right eye, then she reports a burning sensation down her throat.  This then becomes an ear ache with eye pain; and then it travels to the crown of the right side of her head.  She had a CT scan done, which was negative.  She reports she was then referred to ophthalmology and she was to see a neurophthalmology for this concern; however, she was unable to schedule an appointment.  She was started on sumatriptan as needed for her headaches, which she reports does help subside her symptoms.  She is requesting for a referral to neuroopthalmology for further evaluation, and a refill for sumatriptan today.

## 2021-05-27 NOTE — ASSESSMENT & PLAN NOTE
This is a chronic condition.  She complains of chronic LLQ mild pain that is bothersome at times.  She reports she does take ibuprofen with minimal relief; if the pain is very high, she will take an epson salt bath which helps immensely.   She does have a significant surgical history, including complete hysterectomy.

## 2021-06-03 DIAGNOSIS — R51.9 SINUS HEADACHE: ICD-10-CM

## 2021-06-03 NOTE — PROGRESS NOTES
1. Sinus headache  Per referral team, CT sinus needed prior to referral for Dr. Wheeler (ENT).   - CT-LOCALIZATION LIMITED SINUSES; Future

## 2021-06-11 ENCOUNTER — OFFICE VISIT (OUTPATIENT)
Dept: MEDICAL GROUP | Facility: PHYSICIAN GROUP | Age: 51
End: 2021-06-11
Payer: COMMERCIAL

## 2021-06-11 VITALS
TEMPERATURE: 97.1 F | DIASTOLIC BLOOD PRESSURE: 90 MMHG | HEIGHT: 63 IN | SYSTOLIC BLOOD PRESSURE: 130 MMHG | BODY MASS INDEX: 22.68 KG/M2 | HEART RATE: 86 BPM | WEIGHT: 128 LBS | OXYGEN SATURATION: 98 %

## 2021-06-11 DIAGNOSIS — Z23 NEED FOR VACCINATION: ICD-10-CM

## 2021-06-11 DIAGNOSIS — I10 ESSENTIAL HYPERTENSION: ICD-10-CM

## 2021-06-11 DIAGNOSIS — F51.01 PRIMARY INSOMNIA: ICD-10-CM

## 2021-06-11 PROCEDURE — 90472 IMMUNIZATION ADMIN EACH ADD: CPT | Performed by: NURSE PRACTITIONER

## 2021-06-11 PROCEDURE — 99214 OFFICE O/P EST MOD 30 MIN: CPT | Mod: 25 | Performed by: NURSE PRACTITIONER

## 2021-06-11 PROCEDURE — 90750 HZV VACC RECOMBINANT IM: CPT | Performed by: NURSE PRACTITIONER

## 2021-06-11 PROCEDURE — 90471 IMMUNIZATION ADMIN: CPT | Performed by: NURSE PRACTITIONER

## 2021-06-11 PROCEDURE — 90715 TDAP VACCINE 7 YRS/> IM: CPT | Performed by: NURSE PRACTITIONER

## 2021-06-11 RX ORDER — LISINOPRIL 10 MG/1
10 TABLET ORAL DAILY
Qty: 90 TABLET | Refills: 0 | Status: SHIPPED | OUTPATIENT
Start: 2021-06-11 | End: 2021-09-15 | Stop reason: SDUPTHER

## 2021-06-11 NOTE — PROGRESS NOTES
"Subjective  Chief Complaint  Chief Complaint   Patient presents with   • Follow-Up     History of Present Illness  Jil Martinez is a 50 y.o. female. This established patient is here today discuss the following:    Hypertension  This is a chronic condition.  Since our last visit, she has switched from metoprolol 25 mg to lisinopril 10 mg daily.  She reports some hand and feet swelling with initial starting lisinopril 10 mg - lasted for about 4-5 days, but it has since gotten better.  She reports feeling more relaxed.  She denies any cough and headache.  She denies any chest pain, palpitations, feeling flushed in the face, or unusual fatigue.      Primary insomnia  This is chronic condition.  Quality:  Difficulty staying asleep, will wake up intermittently throughout the night and reports feeling tired in the morning.   She has tried melatonin, camomile tea as a sleep aid.  She reports effectiveness to help her fall asleep, but will wake up throughout the night.    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 90 tablet 0   • SUMAtriptan (IMITREX) 50 MG Tab Take 1 tablet by mouth one time as needed for Migraine. 10 tablet 0     No current Epic-ordered facility-administered medications on file.     Review of Systems:   See HPI.      Objective  Physical Exam:   /90 (BP Location: Left arm, Patient Position: Sitting, BP Cuff Size: Adult)   Pulse 86   Temp 36.2 °C (97.1 °F) (Temporal)   Ht 1.6 m (5' 3\")   Wt 58.1 kg (128 lb)   SpO2 98%  Body mass index is 22.67 kg/m².  General:  Alert and oriented.  Well appearing.  NAD  Neck: Supple without JVD. No lymphadenopathy.  Pulmonary:  Normal " effort.  Clear to ausculation without rales, ronchi, or wheezing.  Cardiovascular:  Regular rate and rhythm without murmur, rubs or gallop.   Skin:  Warm and dry.  No obvious lesions.  Musculoskeletal:  No extremity cyanosis, clubbing, or edema.    Assessment/Plan  1. Essential hypertension  This is a chronic condition, stable.   Last metabolic panel available in chart from [facility] (7/2020) - WNL.   Lipid panel (7/2020) - WNL.   ER precautions discussed.   - lisinopril (PRINIVIL) 10 MG Tab; Take 1 tablet by mouth every day.  Dispense: 90 tablet; Refill: 0    2. Primary insomnia  This is a chronic condition, not controlled.   Discussed good sleep hygiene, including:  have same sleep schedule, do not eat or exercise within 2-3 hours of bed, no electronics in bed, no caffeine or alcohol prior to bed, no naps, use bed for sleep/sex only.    Advised to try OTC sleep aids.     3. Need for vaccination  - Tdap =>8yo IM  - Shingles Vaccine (Shingrix)    Health Maintenance: Completed    Return in about 3 months (around 9/11/2021), or if symptoms worsen or fail to improve, for follow up blood pressure.    Patient verbalized understanding and agreed to plan of care.  We have discussed to contact me with needs via [employer] or by phone if needed.      I have placed the above orders and discussed them with an approved delegating provider.  The MA is performing the above orders under the direction of Dr. Jane.    Please note that this dictation was created using voice recognition software. I have made every reasonable attempt to correct obvious errors, but I expect that there are errors of grammar and possibly content that I did not discover before finalizing the note.    EDWARD Sebastian  [facility]

## 2021-06-11 NOTE — ASSESSMENT & PLAN NOTE
This is chronic condition.  Quality:  Difficulty staying asleep, will wake up intermittently throughout the night and reports feeling tired in the morning.   She has tried melatonin, camomile tea as a sleep aid.  She reports effectiveness to help her fall asleep, but will wake up throughout the night.

## 2021-06-11 NOTE — ASSESSMENT & PLAN NOTE
This is a chronic condition.  Since our last visit, she has switched from metoprolol 25 mg to lisinopril 10 mg daily.  She reports some hand and feet swelling with initial starting lisinopril 10 mg - lasted for about 4-5 days, but it has since gotten better.  She reports feeling more relaxed.  She denies any cough and headache.  She denies any chest pain, palpitations, feeling flushed in the face, or unusual fatigue.

## 2021-06-14 ENCOUNTER — HOSPITAL ENCOUNTER (OUTPATIENT)
Dept: RADIOLOGY | Facility: MEDICAL CENTER | Age: 51
End: 2021-06-14
Attending: NURSE PRACTITIONER
Payer: COMMERCIAL

## 2021-06-14 DIAGNOSIS — R51.9 SINUS HEADACHE: ICD-10-CM

## 2021-06-14 PROCEDURE — 70486 CT MAXILLOFACIAL W/O DYE: CPT

## 2021-07-05 ENCOUNTER — HOSPITAL ENCOUNTER (OUTPATIENT)
Dept: RADIOLOGY | Facility: MEDICAL CENTER | Age: 51
End: 2021-07-05
Attending: NURSE PRACTITIONER
Payer: COMMERCIAL

## 2021-07-05 DIAGNOSIS — G89.29 CHRONIC LEFT LOWER QUADRANT PAIN: ICD-10-CM

## 2021-07-05 DIAGNOSIS — R10.32 CHRONIC LEFT LOWER QUADRANT PAIN: ICD-10-CM

## 2021-07-05 PROCEDURE — 76700 US EXAM ABDOM COMPLETE: CPT

## 2021-08-13 ENCOUNTER — NON-PROVIDER VISIT (OUTPATIENT)
Dept: MEDICAL GROUP | Facility: PHYSICIAN GROUP | Age: 51
End: 2021-08-13

## 2021-08-13 VITALS — DIASTOLIC BLOOD PRESSURE: 90 MMHG | SYSTOLIC BLOOD PRESSURE: 132 MMHG

## 2021-08-13 NOTE — PROGRESS NOTES
Jil Martinez is a 51 y.o. female here for a non-provider visit for BP check  Patients BP with own BP machine is 132/94 patient stated she lays on her lounge chair when she take her BP informed her she should sit in a chair with her feet flat on the ground and sit there for at least 5 mins before taking her BP.    Vitals:    08/13/21 0821   BP: 132/90   BP Location: Left arm   Patient Position: Sitting   BP Cuff Size: Adult     If abnormal, was the Registered Nurse (office provider if RN is unavailable) notified today? Not Indicated    Routed to PCP? Yes

## 2021-09-15 ENCOUNTER — OFFICE VISIT (OUTPATIENT)
Dept: MEDICAL GROUP | Facility: PHYSICIAN GROUP | Age: 51
End: 2021-09-15
Payer: COMMERCIAL

## 2021-09-15 VITALS
HEIGHT: 63 IN | OXYGEN SATURATION: 99 % | SYSTOLIC BLOOD PRESSURE: 110 MMHG | HEART RATE: 87 BPM | TEMPERATURE: 97.6 F | DIASTOLIC BLOOD PRESSURE: 80 MMHG | RESPIRATION RATE: 12 BRPM | WEIGHT: 127 LBS | BODY MASS INDEX: 22.5 KG/M2

## 2021-09-15 DIAGNOSIS — Z23 NEED FOR VACCINATION: ICD-10-CM

## 2021-09-15 DIAGNOSIS — Z12.31 ENCOUNTER FOR SCREENING MAMMOGRAM FOR BREAST CANCER: ICD-10-CM

## 2021-09-15 DIAGNOSIS — Z12.11 COLON CANCER SCREENING: ICD-10-CM

## 2021-09-15 DIAGNOSIS — I10 ESSENTIAL HYPERTENSION: ICD-10-CM

## 2021-09-15 DIAGNOSIS — Z13.21 ENCOUNTER FOR VITAMIN DEFICIENCY SCREENING: ICD-10-CM

## 2021-09-15 DIAGNOSIS — F51.01 PRIMARY INSOMNIA: ICD-10-CM

## 2021-09-15 DIAGNOSIS — Z13.220 LIPID SCREENING: ICD-10-CM

## 2021-09-15 DIAGNOSIS — Z13.1 SCREENING FOR DIABETES MELLITUS: ICD-10-CM

## 2021-09-15 PROBLEM — J34.89 OTHER SPECIFIED DISORDERS OF NOSE AND NASAL SINUSES: Status: ACTIVE | Noted: 2021-09-15

## 2021-09-15 PROBLEM — J32.4 CHRONIC PANSINUSITIS: Status: ACTIVE | Noted: 2021-09-15

## 2021-09-15 PROBLEM — J34.3 HYPERTROPHY OF NASAL TURBINATES: Status: ACTIVE | Noted: 2021-09-15

## 2021-09-15 PROCEDURE — 99214 OFFICE O/P EST MOD 30 MIN: CPT | Mod: 25 | Performed by: NURSE PRACTITIONER

## 2021-09-15 PROCEDURE — 90471 IMMUNIZATION ADMIN: CPT | Performed by: NURSE PRACTITIONER

## 2021-09-15 PROCEDURE — 90750 HZV VACC RECOMBINANT IM: CPT | Performed by: NURSE PRACTITIONER

## 2021-09-15 RX ORDER — LISINOPRIL 10 MG/1
10 TABLET ORAL DAILY
Qty: 90 TABLET | Refills: 1 | Status: SHIPPED | OUTPATIENT
Start: 2021-09-15 | End: 2021-10-05

## 2021-09-15 RX ORDER — LISINOPRIL 10 MG/1
10 TABLET ORAL DAILY
Qty: 90 TABLET | Refills: 0 | Status: CANCELLED | OUTPATIENT
Start: 2021-09-15

## 2021-09-15 RX ORDER — CEFDINIR 300 MG/1
CAPSULE ORAL
COMMUNITY
Start: 2021-09-02 | End: 2021-12-07

## 2021-09-15 NOTE — ASSESSMENT & PLAN NOTE
This is a chronic condition, stable.    Current Meds:  Lisinopril 10 mg daily.  She is currently taking all meds as directed.  She does complain of dry cough at night; but she reports it is tolerable and would prefer not to change blood prsesure medication at this time.  She is not taking baby aspirin daily.  She is monitoring BP at home - 130s/80s.  She denies light-headed, tunnel-vision, unusual fatigue, pounding headache, visual changes, palpitations, flushed face.   > Recommended to start 81 mg of ASA daily.  Continue lisinopril 10 mg daily.  Recheck metabolic panel.

## 2021-09-15 NOTE — PROGRESS NOTES
"Subjective  Chief Complaint  Chief Complaint   Patient presents with   • Follow-Up     History of Present Illness  Jil presents today with the following.  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 90 Tablet 1   • SUMAtriptan (IMITREX) 50 MG Tab Take 1 tablet by mouth one time as needed for Migraine. 10 tablet 0     No current Epic-ordered facility-administered medications on file.     Review of Systems  See below.     Objective  Physical Exam  /80 (BP Location: Left arm, Patient Position: Sitting, BP Cuff Size: Adult)   Pulse 87   Temp 36.4 °C (97.6 °F) (Temporal)   Resp 12   Ht 1.6 m (5' 3\")   Wt 57.6 kg (127 lb)   SpO2 99%  Body mass index is 22.5 kg/m².  General:  Alert and oriented.  Well appearing.  NAD  Neck: Supple without JVD. No lymphadenopathy.  Pulmonary:  Normal effort.  Clear to ausculation without rales, ronchi, or wheezing.  Cardiovascular:  Regular rate and rhythm without murmur, rubs or gallop.   Skin:  Warm and dry.  No obvious lesions.  Musculoskeletal:  No extremity cyanosis, clubbing, or edema.    Assessment/Plan  51 y.o. female with the following issues.    1. Essential hypertension  lisinopril (PRINIVIL) 10 MG Tab    CBC WITH DIFFERENTIAL    Comp Metabolic Panel    Lipid Profile   2. Primary insomnia     3. Colon cancer screening  OCCULT BLOOD,FECAL,IMMUNOASSAY   4. Encounter for screening mammogram for breast cancer  " MA-SCREENING MAMMO BILAT W/TOMOSYNTHESIS W/CAD   5. Encounter for vitamin deficiency screening  VITAMIN D,25 HYDROXY   6. Lipid screening  Lipid Profile   7. Screening for diabetes mellitus  Comp Metabolic Panel   8. Need for vaccination  Shingrix Vaccine      Hypertension  This is a chronic condition, stable.    Current Meds:  Lisinopril 10 mg daily.  She is currently taking all meds as directed.  She does complain of dry cough at night; but she reports it is tolerable and would prefer not to change blood prsesure medication at this time.  She is not taking baby aspirin daily.  She is monitoring BP at home - 130s/80s.  She denies light-headed, tunnel-vision, unusual fatigue, pounding headache, visual changes, palpitations, flushed face.   > Recommended to start 81 mg of ASA daily.  Continue lisinopril 10 mg daily.  Recheck metabolic panel.     Primary insomnia  This is a chronic condition, stable.  She reports improvement in her sleep since last visit.  She reports that she has melatonin on hand in the event she does need a sleep aid; and reports she has used in a couple of times since our last visit.  She reports that she feels much improvement with her sleep with her blood pressure better managed.   > Advised to continue good sleep hygiene routine and use of OTC sleep aid relief (melatonin) as needed.     Return in about 6 months (around 3/15/2022).    Health Maintenance: Completed    I have placed the below orders and discussed them with an approved delegating provider.  The MA is performing the below orders under the direction of Dr. Espino.    Please note that this dictation was created using voice recognition software. I have worked with consultants from the vendor as well as technical experts from [employer] to optimize the interface. I have made every reasonable attempt to correct obvious errors, but I expect that there are errors of grammar and possibly content that I did not discover before  finalizing the note.    EDWARD Sebastian  [facility]

## 2021-09-15 NOTE — ASSESSMENT & PLAN NOTE
This is a chronic condition, stable.  She reports improvement in her sleep since last visit.  She reports that she has melatonin on hand in the event she does need a sleep aid; and reports she has used in a couple of times since our last visit.  She reports that she feels much improvement with her sleep with her blood pressure better managed.   > Advised to continue good sleep hygiene routine and use of OTC sleep aid relief (melatonin) as needed.

## 2021-10-05 DIAGNOSIS — I10 PRIMARY HYPERTENSION: ICD-10-CM

## 2021-10-05 RX ORDER — TELMISARTAN 20 MG/1
20 TABLET ORAL DAILY
Qty: 30 TABLET | Refills: 3 | Status: SHIPPED | OUTPATIENT
Start: 2021-10-05 | End: 2021-12-07 | Stop reason: SINTOL

## 2021-10-05 NOTE — PROGRESS NOTES
1. Primary hypertension  Switch from lisinopril 10 mg daily secondary to cough,  - telmisartan (MICARDIS) 20 MG tablet; Take 1 Tablet by mouth every day.  Dispense: 30 Tablet; Refill: 3

## 2021-11-08 DIAGNOSIS — R51.9 SINUS HEADACHE: ICD-10-CM

## 2021-11-09 RX ORDER — SUMATRIPTAN 50 MG/1
TABLET, FILM COATED ORAL
Qty: 10 TABLET | Refills: 2 | Status: SHIPPED | OUTPATIENT
Start: 2021-11-09 | End: 2021-12-21 | Stop reason: SDUPTHER

## 2021-12-03 ENCOUNTER — PATIENT MESSAGE (OUTPATIENT)
Dept: MEDICAL GROUP | Facility: PHYSICIAN GROUP | Age: 51
End: 2021-12-03

## 2021-12-03 DIAGNOSIS — R51.9 SINUS HEADACHE: ICD-10-CM

## 2021-12-03 DIAGNOSIS — J32.4 CHRONIC PANSINUSITIS: ICD-10-CM

## 2021-12-03 NOTE — PROGRESS NOTES
1. Sinus headache  2. Chronic pansinusitis  - Referral to Neurology    Has been seen by ENT, ophthalmology.  CT 7/2020 negative ([facility]).  Continues to have severe headache despite surgical intervention for sinus.  Does take sumatriptan PRN for relief.

## 2021-12-07 ENCOUNTER — OFFICE VISIT (OUTPATIENT)
Dept: MEDICAL GROUP | Facility: PHYSICIAN GROUP | Age: 51
End: 2021-12-07
Payer: COMMERCIAL

## 2021-12-07 VITALS
HEART RATE: 78 BPM | OXYGEN SATURATION: 98 % | HEIGHT: 63 IN | DIASTOLIC BLOOD PRESSURE: 80 MMHG | WEIGHT: 130 LBS | SYSTOLIC BLOOD PRESSURE: 112 MMHG | RESPIRATION RATE: 16 BRPM | BODY MASS INDEX: 23.04 KG/M2 | TEMPERATURE: 97.8 F

## 2021-12-07 DIAGNOSIS — J32.4 CHRONIC PANSINUSITIS: ICD-10-CM

## 2021-12-07 DIAGNOSIS — R51.9 SINUS HEADACHE: ICD-10-CM

## 2021-12-07 DIAGNOSIS — I10 PRIMARY HYPERTENSION: ICD-10-CM

## 2021-12-07 PROCEDURE — 99214 OFFICE O/P EST MOD 30 MIN: CPT | Performed by: NURSE PRACTITIONER

## 2021-12-07 RX ORDER — DOXYCYCLINE HYCLATE 100 MG/1
CAPSULE ORAL
COMMUNITY
Start: 2021-09-17 | End: 2021-12-07

## 2021-12-07 RX ORDER — LISINOPRIL 10 MG/1
10 TABLET ORAL DAILY
COMMUNITY
End: 2021-12-07

## 2021-12-07 RX ORDER — PROPRANOLOL HYDROCHLORIDE 20 MG/1
20 TABLET ORAL 2 TIMES DAILY
Qty: 60 TABLET | Refills: 0 | Status: SHIPPED | OUTPATIENT
Start: 2021-12-07 | End: 2022-01-11 | Stop reason: SDUPTHER

## 2021-12-07 RX ORDER — ALPRAZOLAM 1 MG/1
TABLET ORAL
COMMUNITY
Start: 2021-09-30 | End: 2021-12-07
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
## 2021-12-07 NOTE — ASSESSMENT & PLAN NOTE
This is a chronic condition, stable.  She reports that she stopped Telmisartan as it caused nausea/vomiting; and resumed lisinopril 10 and reports cough is tolerable.  She denies any cardiac symptoms today.  She does have frequent headaches however since after sinus surgery.  > Stop lisinopril.  Start propranolol to trial for headache relief and blood pressure management.  Side effects advised; and discussed to monitor for any hypotensive/hypertensive symptoms.  She will monitor blood pressure at home and information if blood pressure >140/90.
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
## 2021-12-23 DIAGNOSIS — R51.9 SINUS HEADACHE: ICD-10-CM

## 2021-12-23 RX ORDER — SUMATRIPTAN 50 MG/1
50 TABLET, FILM COATED ORAL
Qty: 10 TABLET | Refills: 2 | Status: SHIPPED | OUTPATIENT
Start: 2021-12-23 | End: 2022-01-11 | Stop reason: SDUPTHER

## 2022-02-03 ENCOUNTER — HOSPITAL ENCOUNTER (OUTPATIENT)
Dept: RADIOLOGY | Facility: MEDICAL CENTER | Age: 52
End: 2022-02-03
Attending: STUDENT IN AN ORGANIZED HEALTH CARE EDUCATION/TRAINING PROGRAM
Payer: COMMERCIAL

## 2022-02-03 DIAGNOSIS — G50.0 TRIGEMINAL NEURALGIA: ICD-10-CM

## 2022-03-11 ENCOUNTER — HOSPITAL ENCOUNTER (OUTPATIENT)
Dept: RADIOLOGY | Facility: MEDICAL CENTER | Age: 52
End: 2022-03-11
Attending: STUDENT IN AN ORGANIZED HEALTH CARE EDUCATION/TRAINING PROGRAM
Payer: COMMERCIAL

## 2022-03-21 ENCOUNTER — PATIENT MESSAGE (OUTPATIENT)
Dept: MEDICAL GROUP | Facility: PHYSICIAN GROUP | Age: 52
End: 2022-03-21
Payer: COMMERCIAL

## 2022-03-21 DIAGNOSIS — R51.9 SINUS HEADACHE: ICD-10-CM

## 2022-03-21 RX ORDER — SUMATRIPTAN 50 MG/1
50 TABLET, FILM COATED ORAL
Qty: 10 TABLET | Refills: 2 | Status: SHIPPED | OUTPATIENT
Start: 2022-03-21 | End: 2022-04-18

## 2022-03-21 NOTE — PATIENT COMMUNICATION
Received request via: Patient    Was the patient seen in the last year in this department? Yes    Does the patient have an active prescription (recently filled or refills available) for medication(s) requested? No     Requested Prescriptions     Pending Prescriptions Disp Refills   • SUMAtriptan (IMITREX) 50 MG Tab 10 Tablet 2     Sig: Take 1 Tablet by mouth one time as needed for Migraine for up to 1 dose.

## 2022-03-21 NOTE — TELEPHONE ENCOUNTER
From: Jil Martinez  To: Physician Rubén Montana  Sent: 3/21/2022 12:45 PM PDT  Subject: Medication refill authorization    Hello I need someone to authorize my sumatriptan medication. I understand Claudia is no longer with you. Could someone please authorize my refill to [facility] on [address]?

## 2022-03-29 ENCOUNTER — APPOINTMENT (OUTPATIENT)
Dept: RADIOLOGY | Facility: MEDICAL CENTER | Age: 52
End: 2022-03-29
Attending: STUDENT IN AN ORGANIZED HEALTH CARE EDUCATION/TRAINING PROGRAM
Payer: COMMERCIAL

## 2022-04-07 ENCOUNTER — OFFICE VISIT (OUTPATIENT)
Dept: URGENT CARE | Facility: PHYSICIAN GROUP | Age: 52
End: 2022-04-07
Payer: COMMERCIAL

## 2022-04-07 VITALS
OXYGEN SATURATION: 99 % | HEART RATE: 76 BPM | DIASTOLIC BLOOD PRESSURE: 84 MMHG | HEIGHT: 63 IN | BODY MASS INDEX: 23.04 KG/M2 | SYSTOLIC BLOOD PRESSURE: 172 MMHG | RESPIRATION RATE: 16 BRPM | WEIGHT: 130 LBS | TEMPERATURE: 98.2 F

## 2022-04-07 DIAGNOSIS — I10 PRIMARY HYPERTENSION: ICD-10-CM

## 2022-04-07 NOTE — PROGRESS NOTES
Patient is not seen today. She states she thought she was seeing a primary care provider to reestablish as her previous PCP left [facility]. She reports upcoming appointment Monday. Patient advised to go to the ER if having symptoms before she can see her provider. She endorses elevated BP, dizziness and severe headache. Patient was ordered MRI which she cancelled in the past.  Patient verbalized understanding.

## 2022-04-11 ENCOUNTER — OFFICE VISIT (OUTPATIENT)
Dept: MEDICAL GROUP | Facility: PHYSICIAN GROUP | Age: 52
End: 2022-04-11
Payer: COMMERCIAL

## 2022-04-11 ENCOUNTER — PATIENT MESSAGE (OUTPATIENT)
Dept: MEDICAL GROUP | Facility: PHYSICIAN GROUP | Age: 52
End: 2022-04-11

## 2022-04-11 VITALS
DIASTOLIC BLOOD PRESSURE: 88 MMHG | HEIGHT: 63 IN | BODY MASS INDEX: 22.86 KG/M2 | OXYGEN SATURATION: 99 % | RESPIRATION RATE: 14 BRPM | HEART RATE: 78 BPM | TEMPERATURE: 96.9 F | SYSTOLIC BLOOD PRESSURE: 166 MMHG | WEIGHT: 129 LBS

## 2022-04-11 DIAGNOSIS — J32.4 CHRONIC PANSINUSITIS: ICD-10-CM

## 2022-04-11 DIAGNOSIS — E55.9 VITAMIN D DEFICIENCY: ICD-10-CM

## 2022-04-11 DIAGNOSIS — M26.642 ARTHRITIS OF LEFT TEMPOROMANDIBULAR JOINT: ICD-10-CM

## 2022-04-11 DIAGNOSIS — R73.01 IMPAIRED FASTING GLUCOSE: ICD-10-CM

## 2022-04-11 DIAGNOSIS — F17.200 TOBACCO DEPENDENCE: ICD-10-CM

## 2022-04-11 DIAGNOSIS — Z00.00 HEALTH CARE MAINTENANCE: ICD-10-CM

## 2022-04-11 DIAGNOSIS — I10 PRIMARY HYPERTENSION: ICD-10-CM

## 2022-04-11 DIAGNOSIS — Z13.21 ENCOUNTER FOR VITAMIN DEFICIENCY SCREENING: ICD-10-CM

## 2022-04-11 DIAGNOSIS — R51.9 SINUS HEADACHE: ICD-10-CM

## 2022-04-11 DIAGNOSIS — M26.641 ARTHRITIS OF RIGHT TEMPOROMANDIBULAR JOINT: ICD-10-CM

## 2022-04-11 DIAGNOSIS — Z13.220 LIPID SCREENING: ICD-10-CM

## 2022-04-11 DIAGNOSIS — Z13.29 SCREENING FOR THYROID DISORDER: ICD-10-CM

## 2022-04-11 DIAGNOSIS — G44.011 INTRACTABLE EPISODIC CLUSTER HEADACHE: ICD-10-CM

## 2022-04-11 PROBLEM — G44.009 CLUSTER HEADACHES: Status: ACTIVE | Noted: 2022-04-11

## 2022-04-11 PROBLEM — J34.89 OTHER SPECIFIED DISORDERS OF NOSE AND NASAL SINUSES: Status: RESOLVED | Noted: 2021-09-15 | Resolved: 2022-04-11

## 2022-04-11 PROCEDURE — 99214 OFFICE O/P EST MOD 30 MIN: CPT | Performed by: INTERNAL MEDICINE

## 2022-04-11 RX ORDER — PROPRANOLOL HYDROCHLORIDE 20 MG/1
20 TABLET ORAL 2 TIMES DAILY
Qty: 180 TABLET | Refills: 0 | Status: SHIPPED | OUTPATIENT
Start: 2022-04-11 | End: 2023-09-18

## 2022-04-11 RX ORDER — VERAPAMIL HYDROCHLORIDE 100 MG/1
CAPSULE, EXTENDED RELEASE ORAL
COMMUNITY
Start: 2022-04-04 | End: 2022-05-10

## 2022-04-11 RX ORDER — VALSARTAN 80 MG/1
80 TABLET ORAL DAILY
Qty: 45 TABLET | Refills: 3 | Status: SHIPPED | OUTPATIENT
Start: 2022-04-11 | End: 2022-05-10

## 2022-04-11 ASSESSMENT — PATIENT HEALTH QUESTIONNAIRE - PHQ9: CLINICAL INTERPRETATION OF PHQ2 SCORE: 0

## 2022-04-11 NOTE — PROGRESS NOTES
New Patient to establish    Chief Complaint   Patient presents with   • Establish Care   • Hypertension       Subjective:     History of Present Illness: Patient is a 51 y.o. female who is here today to establish primary care    Current Outpatient Medications on File Prior to Visit   Medication Sig Dispense Refill   • SUMAtriptan (IMITREX) 50 MG Tab Take 1 Tablet by mouth one time as needed for Migraine for up to 1 dose. 10 Tablet 2   • propranolol (INDERAL) 20 MG Tab Take 1 Tablet by mouth 2 times a day. 180 Tablet 0   • Verapamil HCl  MG CAPSULE SR 24 HR  (Patient not taking: Reported on 4/11/2022)       No current facility-administered medications on file prior to visit.     Allergies   Allergen Reactions   • Fentanyl      angry   • Methylprednisolone Sodium Succ      Patient Active Problem List    Diagnosis Date Noted   • Tobacco dependence 04/11/2022   • Cluster headaches 04/11/2022   • Arthritis of left temporomandibular joint 04/11/2022   • Chronic pansinusitis 09/15/2021   • Hypertrophy of nasal turbinates 09/15/2021   • Primary insomnia 06/11/2021   • Hypertension 05/27/2021   • Teeth problem 05/27/2021   • Chronic left lower quadrant pain 05/27/2021     Past Medical History:   Diagnosis Date   • Endometriosis     partial cystectomy   • Hypertension 5/27/2021   • Renal disorder     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     Past Surgical History:   Procedure Laterality Date   • ABDOMINAL HYSTERECTOMY TOTAL      complete   • GYN SURGERY      hysterectomy   • OTHER      bilateral large toe surgeries   • OTHER ORTHOPEDIC SURGERY      bilateral wrists - membranes filled with fluid   • URETEROSCOPY       Family History   Problem Relation Age of Onset   • Hypertension Mother    • Heart Disease Father    • Stroke Father    • Hypertension Father    • Cancer Maternal Uncle         brain   • Diabetes Maternal Grandmother    • Hypertension Maternal Grandmother    • Hypertension Maternal Grandfather    •  "Hypertension Paternal Grandmother    • Hypertension Paternal Grandfather    • Hyperlipidemia Neg Hx      Social History     Tobacco Use   • Smoking status: Former Smoker     Packs/day: 0.50     Years: 20.00     Pack years: 10.00     Quit date: 2021     Years since quittin.0   • Smokeless tobacco: Never Used   • Tobacco comment: 1 pack per day   Vaping Use   • Vaping Use: Never used   Substance Use Topics   • Alcohol use: Yes     Comment: rarely   • Drug use: No       ROS:  All other systems reviewed and are negative except as stated in the HPI       Physical Exam:     BP (!) 166/88 (BP Location: Right arm, Patient Position: Sitting, BP Cuff Size: Adult)   Pulse 78   Temp 36.1 °C (96.9 °F) (Temporal)   Resp 14   Ht 1.6 m (5' 3\")   Wt 58.5 kg (129 lb)   SpO2 99%   BMI 22.85 kg/m²   General: Normal appearing. No distress.  Pulmonary: Clear to ausculation.  Normal effort.   Cardiovascular: Regular rate and rhythm    Assessment and Plan:     1. Tobacco dependence  Reported 1 pack every 2-week, before 1 pack a day,    -Counseled about the negative health risks of smoking, pt expressed understanding  -Encouraged smoking cessation  -CTM and f/u with support to quit     - CBC WITH DIFFERENTIAL; Future    2. Primary hypertension  Patient check blood pressure at home with range from systolic 130s to 170s, diastolic 90s to 100s low, patient also has episodic headache as well, denied having other related symptoms  > She was on telmisartan, previous records mention nausea and vomiting, she was on lisinopril with possibility of caps as a side effect  - valsartan (DIOVAN) 80 MG Tab; Take 1 Tablet by mouth every day.  Dispense: 45 Tablet; Refill: 3  - CBC WITH DIFFERENTIAL; Future    -discussion in details on target blood pressure goal, advised monitoring BP closely at home.  Have BP log to present at follow-up visit or send through my chart.   -Advised low-salt diet, healthy dietary option include plenty of " vegetable, reduce refine carbohydrates and sugar, regular exercise as tolerated, healthy fat/protein/carbs, also avoid alcohol, no NSAIDs  If symptoms worsen or persist patient can return to clinic for reevaluation.  Red flags and strict emergency room precautions discussed.  Discussed side effects of medication. Patient understand      3. Intractable episodic cluster headache  Episodic, before few times a month, currently every day, usually on one side of the head, associated with ice picking of the eyes, congested eyes, runny nose, ear pain, not sure about alleviating or predisposing factor, will respond to sumatriptan, she is currently seen by neurology, advised to get records from neurology  -Differential including cluster headache  - HOMOCYSTEINE; Future  - VIT B12,  FOLIC ACID  - VITAMIN B1; Future  - VITAMIN B2 (RIBOFLAVIN); Future  - VITAMIN B6; Future    5. Arthritis of left temporomandibular joint  Reported grinding teeth, she has CAT scan of the sinuses with evidence of arthritis of the left TMJ, educated follow-up with a dentist that may also alleviate headache    6. Screening for thyroid disorder  - T3 FREE; Future  - THYROID PEROXIDASE  (TPO) AB; Future    7. Encounter for vitamin deficiency screening  - HOMOCYSTEINE; Future  - VIT B12,  FOLIC ACID  - VITAMIN B1; Future  - VITAMIN B2 (RIBOFLAVIN); Future  - VITAMIN B6; Future    9. Impaired fasting glucose  - HEMOGLOBIN A1C; Future  - INSULIN FASTING; Future    10. Lipid screening  - LipoFit by NMR; Future    11. Vitamin D deficiency  - VITAMIN D,25 HYDROXY; Future    Follow Up:      Return in about 4 weeks (around 5/9/2022).  Labs,   Please note that this dictation was created using voice recognition software. I have made every reasonable attempt to correct obvious errors, but I expect that there are errors of grammar and possibly content that I did not discover before finalizing the note.    Signed by: Rubén Montana M.D.

## 2022-04-11 NOTE — LETTER
[facility]  Rubén Montana M.D.  [address]  [place] [address]  Fax: [phone]   Authorization for Release/Disclosure of   Protected Health Information   Name: JIL MARTINEZ : 1970 SSN: [id number]   Address: [address] [place] [address]  [place] [address] Phone:    [phone] (home)    I authorize the entity listed below to release/disclose the PHI below to:   [facility]/Rubén Montana M.D. and Rubén Montana M.D.   Provider or Entity Name:  Dr. Patel   Address   City, State, Zip   Phone:      Fax:  [phone]   Reason for request: continuity of care   Information to be released:    [  ] LAST COLONOSCOPY,  including any PATH REPORT and follow-up  [  ] LAST FIT/COLOGUARD RESULT [  ] LAST DEXA  [  ] LAST MAMMOGRAM  [  ] LAST PAP  [  ] LAST LABS [  ] RETINA EXAM REPORT  [  ] IMMUNIZATION RECORDS  [ xxx ] Release all info      [  ] Check here and initial the line next to each item to release ALL health information INCLUDING  _____ Care and treatment for drug and / or alcohol abuse  _____ HIV testing, infection status, or AIDS  _____ Genetic Testing    DATES OF SERVICE OR TIME PERIOD TO BE DISCLOSED: _____________  I understand and acknowledge that:  * This Authorization may be revoked at any time by you in writing, except if your health information has already been used or disclosed.  * Your health information that will be used or disclosed as a result of you signing this authorization could be re-disclosed by the recipient. If this occurs, your re-disclosed health information may no longer be protected by State or Federal laws.  * You may refuse to sign this Authorization. Your refusal will not affect your ability to obtain treatment.  * This Authorization becomes effective upon signing and will  on (date) __________.      If no date is indicated, this Authorization will  one (1) year from the signature date.    Name: Jil Martinez    Signature: Continuity of  care    Date:     4/11/2022       PLEASE FAX REQUESTED RECORDS BACK TO: [phone]

## 2022-04-16 DIAGNOSIS — R51.9 SINUS HEADACHE: ICD-10-CM

## 2022-04-18 RX ORDER — SUMATRIPTAN 50 MG/1
50 TABLET, FILM COATED ORAL
Qty: 10 TABLET | Refills: 0 | Status: SHIPPED | OUTPATIENT
Start: 2022-04-18 | End: 2022-05-10 | Stop reason: SDUPTHER

## 2022-04-22 ENCOUNTER — HOSPITAL ENCOUNTER (OUTPATIENT)
Dept: CARDIOLOGY | Facility: MEDICAL CENTER | Age: 52
End: 2022-04-22
Attending: PSYCHIATRY & NEUROLOGY
Payer: COMMERCIAL

## 2022-04-22 ENCOUNTER — HOSPITAL ENCOUNTER (OUTPATIENT)
Dept: LAB | Facility: MEDICAL CENTER | Age: 52
End: 2022-04-22
Attending: INTERNAL MEDICINE
Payer: COMMERCIAL

## 2022-04-22 DIAGNOSIS — I10 PRIMARY HYPERTENSION: ICD-10-CM

## 2022-04-22 DIAGNOSIS — G44.011 INTRACTABLE EPISODIC CLUSTER HEADACHE: ICD-10-CM

## 2022-04-22 DIAGNOSIS — F17.200 TOBACCO DEPENDENCE: ICD-10-CM

## 2022-04-22 DIAGNOSIS — Z13.220 LIPID SCREENING: ICD-10-CM

## 2022-04-22 DIAGNOSIS — E55.9 VITAMIN D DEFICIENCY: ICD-10-CM

## 2022-04-22 DIAGNOSIS — Z13.29 SCREENING FOR THYROID DISORDER: ICD-10-CM

## 2022-04-22 DIAGNOSIS — R73.01 IMPAIRED FASTING GLUCOSE: ICD-10-CM

## 2022-04-22 DIAGNOSIS — Z13.21 ENCOUNTER FOR VITAMIN DEFICIENCY SCREENING: ICD-10-CM

## 2022-04-22 LAB
25(OH)D3 SERPL-MCNC: 36 NG/ML (ref 30–100)
BASOPHILS # BLD AUTO: 0.3 % (ref 0–1.8)
BASOPHILS # BLD: 0.03 K/UL (ref 0–0.12)
EKG IMPRESSION: NORMAL
EOSINOPHIL # BLD AUTO: 0.17 K/UL (ref 0–0.51)
EOSINOPHIL NFR BLD: 1.7 % (ref 0–6.9)
ERYTHROCYTE [DISTWIDTH] IN BLOOD BY AUTOMATED COUNT: 47.8 FL (ref 35.9–50)
EST. AVERAGE GLUCOSE BLD GHB EST-MCNC: 103 MG/DL
FOLATE SERPL-MCNC: 15.5 NG/ML
HBA1C MFR BLD: 5.2 % (ref 4–5.6)
HCT VFR BLD AUTO: 44.8 % (ref 37–47)
HCYS SERPL-SCNC: 11.68 UMOL/L
HGB BLD-MCNC: 14.9 G/DL (ref 12–16)
IMM GRANULOCYTES # BLD AUTO: 0.03 K/UL (ref 0–0.11)
IMM GRANULOCYTES NFR BLD AUTO: 0.3 % (ref 0–0.9)
LYMPHOCYTES # BLD AUTO: 2.23 K/UL (ref 1–4.8)
LYMPHOCYTES NFR BLD: 21.9 % (ref 22–41)
MCH RBC QN AUTO: 34 PG (ref 27–33)
MCHC RBC AUTO-ENTMCNC: 33.3 G/DL (ref 33.6–35)
MCV RBC AUTO: 102.3 FL (ref 81.4–97.8)
MONOCYTES # BLD AUTO: 0.75 K/UL (ref 0–0.85)
MONOCYTES NFR BLD AUTO: 7.4 % (ref 0–13.4)
NEUTROPHILS # BLD AUTO: 6.95 K/UL (ref 2–7.15)
NEUTROPHILS NFR BLD: 68.4 % (ref 44–72)
NRBC # BLD AUTO: 0 K/UL
NRBC BLD-RTO: 0 /100 WBC
PLATELET # BLD AUTO: 227 K/UL (ref 164–446)
PMV BLD AUTO: 10.4 FL (ref 9–12.9)
RBC # BLD AUTO: 4.38 M/UL (ref 4.2–5.4)
T3FREE SERPL-MCNC: 2.93 PG/ML (ref 2–4.4)
THYROPEROXIDASE AB SERPL-ACNC: <9 IU/ML (ref 0–9)
VIT B12 SERPL-MCNC: 361 PG/ML (ref 211–911)
WBC # BLD AUTO: 10.2 K/UL (ref 4.8–10.8)

## 2022-04-22 PROCEDURE — 84207 ASSAY OF VITAMIN B-6: CPT

## 2022-04-22 PROCEDURE — 82607 VITAMIN B-12: CPT

## 2022-04-22 PROCEDURE — 84425 ASSAY OF VITAMIN B-1: CPT

## 2022-04-22 PROCEDURE — 36415 COLL VENOUS BLD VENIPUNCTURE: CPT

## 2022-04-22 PROCEDURE — 83704 LIPOPROTEIN BLD QUAN PART: CPT

## 2022-04-22 PROCEDURE — 93010 ELECTROCARDIOGRAM REPORT: CPT | Performed by: INTERNAL MEDICINE

## 2022-04-22 PROCEDURE — 85025 COMPLETE CBC W/AUTO DIFF WBC: CPT

## 2022-04-22 PROCEDURE — 82306 VITAMIN D 25 HYDROXY: CPT

## 2022-04-22 PROCEDURE — 86376 MICROSOMAL ANTIBODY EACH: CPT

## 2022-04-22 PROCEDURE — 84252 ASSAY OF VITAMIN B-2: CPT

## 2022-04-22 PROCEDURE — 93005 ELECTROCARDIOGRAM TRACING: CPT | Performed by: PSYCHIATRY & NEUROLOGY

## 2022-04-22 PROCEDURE — 83090 ASSAY OF HOMOCYSTEINE: CPT

## 2022-04-22 PROCEDURE — 83036 HEMOGLOBIN GLYCOSYLATED A1C: CPT

## 2022-04-22 PROCEDURE — 84481 FREE ASSAY (FT-3): CPT

## 2022-04-22 PROCEDURE — 83525 ASSAY OF INSULIN: CPT

## 2022-04-22 PROCEDURE — 82746 ASSAY OF FOLIC ACID SERUM: CPT

## 2022-04-22 PROCEDURE — 80061 LIPID PANEL: CPT

## 2022-04-25 ENCOUNTER — ANESTHESIA EVENT (OUTPATIENT)
Dept: RADIOLOGY | Facility: MEDICAL CENTER | Age: 52
End: 2022-04-25
Payer: COMMERCIAL

## 2022-04-25 ENCOUNTER — HOSPITAL ENCOUNTER (OUTPATIENT)
Dept: RADIOLOGY | Facility: MEDICAL CENTER | Age: 52
End: 2022-04-25
Attending: PSYCHIATRY & NEUROLOGY
Payer: COMMERCIAL

## 2022-04-25 ENCOUNTER — ANESTHESIA (OUTPATIENT)
Dept: RADIOLOGY | Facility: MEDICAL CENTER | Age: 52
End: 2022-04-25
Payer: COMMERCIAL

## 2022-04-25 VITALS
WEIGHT: 130.51 LBS | HEART RATE: 78 BPM | DIASTOLIC BLOOD PRESSURE: 81 MMHG | OXYGEN SATURATION: 96 % | BODY MASS INDEX: 23.12 KG/M2 | SYSTOLIC BLOOD PRESSURE: 139 MMHG | HEIGHT: 63 IN | RESPIRATION RATE: 16 BRPM | TEMPERATURE: 96.5 F

## 2022-04-25 DIAGNOSIS — G43.809 LOWER HALF MIGRAINE: ICD-10-CM

## 2022-04-25 PROCEDURE — 700105 HCHG RX REV CODE 258: Performed by: ANESTHESIOLOGY

## 2022-04-25 PROCEDURE — 700101 HCHG RX REV CODE 250: Performed by: ANESTHESIOLOGY

## 2022-04-25 PROCEDURE — 70551 MRI BRAIN STEM W/O DYE: CPT

## 2022-04-25 PROCEDURE — 01922 ANES N-INVAS IMG/RADJ THER: CPT | Performed by: ANESTHESIOLOGY

## 2022-04-25 PROCEDURE — 700111 HCHG RX REV CODE 636 W/ 250 OVERRIDE (IP): Performed by: ANESTHESIOLOGY

## 2022-04-25 RX ORDER — MEPERIDINE HYDROCHLORIDE 25 MG/ML
12.5 INJECTION INTRAMUSCULAR; INTRAVENOUS; SUBCUTANEOUS
Status: DISCONTINUED | OUTPATIENT
Start: 2022-04-25 | End: 2022-04-26 | Stop reason: HOSPADM

## 2022-04-25 RX ORDER — KETOROLAC TROMETHAMINE 30 MG/ML
30 INJECTION, SOLUTION INTRAMUSCULAR; INTRAVENOUS ONCE
Status: DISCONTINUED | OUTPATIENT
Start: 2022-04-25 | End: 2022-04-26 | Stop reason: HOSPADM

## 2022-04-25 RX ORDER — ONDANSETRON 2 MG/ML
4 INJECTION INTRAMUSCULAR; INTRAVENOUS
Status: DISCONTINUED | OUTPATIENT
Start: 2022-04-25 | End: 2022-04-26 | Stop reason: HOSPADM

## 2022-04-25 RX ORDER — HALOPERIDOL 5 MG/ML
1 INJECTION INTRAMUSCULAR
Status: DISCONTINUED | OUTPATIENT
Start: 2022-04-25 | End: 2022-04-26 | Stop reason: HOSPADM

## 2022-04-25 RX ORDER — SODIUM CHLORIDE, SODIUM LACTATE, POTASSIUM CHLORIDE, CALCIUM CHLORIDE 600; 310; 30; 20 MG/100ML; MG/100ML; MG/100ML; MG/100ML
INJECTION, SOLUTION INTRAVENOUS CONTINUOUS
Status: DISCONTINUED | OUTPATIENT
Start: 2022-04-25 | End: 2022-04-26 | Stop reason: HOSPADM

## 2022-04-25 RX ORDER — ONDANSETRON 2 MG/ML
INJECTION INTRAMUSCULAR; INTRAVENOUS PRN
Status: DISCONTINUED | OUTPATIENT
Start: 2022-04-25 | End: 2022-04-25 | Stop reason: SURG

## 2022-04-25 RX ORDER — LIDOCAINE HYDROCHLORIDE 20 MG/ML
INJECTION, SOLUTION EPIDURAL; INFILTRATION; INTRACAUDAL; PERINEURAL PRN
Status: DISCONTINUED | OUTPATIENT
Start: 2022-04-25 | End: 2022-04-25 | Stop reason: SURG

## 2022-04-25 RX ORDER — SODIUM CHLORIDE, SODIUM LACTATE, POTASSIUM CHLORIDE, CALCIUM CHLORIDE 600; 310; 30; 20 MG/100ML; MG/100ML; MG/100ML; MG/100ML
INJECTION, SOLUTION INTRAVENOUS
Status: DISCONTINUED | OUTPATIENT
Start: 2022-04-25 | End: 2022-04-25 | Stop reason: SURG

## 2022-04-25 RX ADMIN — ONDANSETRON 4 MG: 2 INJECTION INTRAMUSCULAR; INTRAVENOUS at 15:55

## 2022-04-25 RX ADMIN — PROPOFOL 150 MG: 10 INJECTION, EMULSION INTRAVENOUS at 15:25

## 2022-04-25 RX ADMIN — SODIUM CHLORIDE, POTASSIUM CHLORIDE, SODIUM LACTATE AND CALCIUM CHLORIDE: 600; 310; 30; 20 INJECTION, SOLUTION INTRAVENOUS at 15:21

## 2022-04-25 RX ADMIN — LIDOCAINE HYDROCHLORIDE 60 MG: 20 INJECTION, SOLUTION EPIDURAL; INFILTRATION; INTRACAUDAL at 15:25

## 2022-04-25 ASSESSMENT — PAIN SCALES - GENERAL: PAIN_LEVEL: 0

## 2022-04-25 NOTE — DISCHARGE INSTRUCTIONS
MRI ADULT DISCHARGE INSTRUCTIONS    You have been medicated today for your scan. Please follow the instructions below to ensure your safe recovery. If you have any questions or problems, feel free to call us at [phone] or [phone].     1.   Have someone stay with you to assist you as needed.    2.   Do not drive or operate any mechanical devices.    3.   Do not perform any activity that requires concentration. Make no major decisions over the next 24 hours.     4.   Be careful changing positions from laying down to sitting or standing, as you may become dizzy.     5.   Do not drink alcohol for 48 hours.    6.   There are no restrictions for eating your normal meals. Drink fluids.    7.   You may continue your usual medications for pain, tranquilizers, muscle relaxants or sedatives when awake.     8.   Tomorrow, you may continue your normal daily activities.     9.   Pressure dressing on 10 - 15 minutes. If swelling or bleeding occurs when removed, continue placing direct pressure on injection site for another 5 minutes, or until bleeding stops.     I have been informed of and understand the above discharge instructions.

## 2022-04-25 NOTE — PROGRESS NOTES
Patient to MRI outpatient dept. Patient informed process and plan of care during this visit.  Anesthesiologist Dr. Granados spoke with Patient and discussed provider's plan of care.  Consent obtained.  MRI completed without incident.  Patient tolerated diet and activities once awake, alert, and appropriate.  DC instructions discussed with Patient and .  Questions encouraged and answered.  Defer to Provider for further instruction.  Patient discharged in stable condition to responsible adult.

## 2022-04-25 NOTE — ANESTHESIA POSTPROCEDURE EVALUATION
Patient: Jil Martinez    Procedure Summary     Date: 04/25/22 [address]    Anesthesia Start: 1521 Anesthesia Stop: 1558    Procedure: MR-BRAIN-W/O Diagnosis: Lower half migraine    Scheduled Providers: Marc Granados M.D. Responsible Provider: Marc Granados M.D.    Anesthesia Type: general ASA Status: 3          Final Anesthesia Type: general  Last vitals  BP   Blood Pressure: 132/95    Temp   35.8 °C (96.5 °F)    Pulse   81   Resp   16    SpO2   96 %      Anesthesia Post Evaluation    Patient location during evaluation: PACU  Patient participation: complete - patient participated  Level of consciousness: awake and alert  Pain score: 0    Airway patency: patent  Anesthetic complications: no  Cardiovascular status: hemodynamically stable  Respiratory status: acceptable  Hydration status: euvolemic    PONV: none          No complications documented.

## 2022-04-25 NOTE — ANESTHESIA TIME REPORT
Anesthesia Start and Stop Event Times     Date Time Event    4/25/2022 1520 Ready for Procedure     1521 Anesthesia Start     1558 Anesthesia Stop        Responsible Staff  04/25/22    Name Role Begin End    Marc Granados M.D. Anesth 1521 1558        Overtime Reason:  no overtime (within assigned shift)    Comments:

## 2022-04-25 NOTE — ANESTHESIA PREPROCEDURE EVALUATION
Date/Time: 04/25/22 1545    Scheduled providers: Marc Granados M.D.    Procedure: MR-BRAIN-W/O    Diagnosis: Lower half migraine [G43.809]    Location: [facility] IMAGING - MRI - [address]          Relevant Problems   NEURO   (positive) Cluster headaches      CARDIAC   (positive) Hypertension      Other   (positive) Arthritis of left temporomandibular joint       Physical Exam    Airway   Mallampati: II  TM distance: >3 FB  Neck ROM: full       Cardiovascular - normal exam  Rhythm: regular  Rate: normal  (-) murmur     Dental - normal exam           Pulmonary - normal exam  Breath sounds clear to auscultation     Abdominal    Neurological - normal exam                 Anesthesia Plan    ASA 3   ASA physical status 3 criteria: hypertension - poorly controlled    Plan - general       Airway plan will be LMA          Induction: intravenous      Pertinent diagnostic labs and testing reviewed    Informed Consent:    Anesthetic plan and risks discussed with patient.

## 2022-04-25 NOTE — ANESTHESIA PROCEDURE NOTES
Airway    Date/Time: 4/25/2022 3:26 PM  Performed by: Marc Granados M.D.  Authorized by: Marc Granados M.D.     Location:  OR  Urgency:  Elective  Indications for Airway Management:  Anesthesia      Spontaneous Ventilation: absent    Sedation Level:  Deep  Preoxygenated: Yes    Mask Difficulty Assessment:  1 - vent by mask  Final Airway Type:  Supraglottic airway  Final Supraglottic Airway:  Standard LMA    SGA Size:  3  Number of Attempts at Approach:  1

## 2022-04-26 LAB — INSULIN P FAST SERPL-ACNC: 8 UIU/ML (ref 3–25)

## 2022-04-27 ENCOUNTER — PATIENT MESSAGE (OUTPATIENT)
Dept: MEDICAL GROUP | Facility: PHYSICIAN GROUP | Age: 52
End: 2022-04-27
Payer: COMMERCIAL

## 2022-04-27 LAB
CHOLEST SERPL-MCNC: 211 MG/DL
HDL PARTICAL NO Q4363: 36.9 UMOL/L
HDL SIZE Q4361: 8.7 NM
HDLC SERPL-MCNC: 51 MG/DL (ref 40–59)
HLD.LARGE SERPL-SCNC: 4.4 UMOL/L
L VLDL PART NO Q4357: 6.1 NMOL/L
LDL SERPL QN: 21 NM
LDL SERPL-SCNC: 1452 NMOL/L
LDL SMALL SERPL-SCNC: 675 NMOL/L
LDLC SERPL CALC-MCNC: 122 MG/DL
PATHOLOGY STUDY: ABNORMAL
TRIGL SERPL-MCNC: 189 MG/DL (ref 30–149)
VIT B1 BLD-MCNC: 159 NMOL/L (ref 70–180)
VIT B2 SERPL-SCNC: 5 NMOL/L (ref 5–50)
VIT B6 SERPL-MCNC: 46.1 NMOL/L (ref 20–125)
VLDL SIZE Q4362: 49.8 NM

## 2022-05-05 ENCOUNTER — TELEPHONE (OUTPATIENT)
Dept: MEDICAL GROUP | Facility: PHYSICIAN GROUP | Age: 52
End: 2022-05-05
Payer: COMMERCIAL

## 2022-05-05 NOTE — PATIENT COMMUNICATION
Phone Number Called: [phone] (home)       Call outcome: Spoke to patient regarding message below.    Message: Pt advised of provider note. An appt for 5/10 has been to touch bases on this.

## 2022-05-10 ENCOUNTER — OFFICE VISIT (OUTPATIENT)
Dept: MEDICAL GROUP | Facility: PHYSICIAN GROUP | Age: 52
End: 2022-05-10
Payer: COMMERCIAL

## 2022-05-10 VITALS
RESPIRATION RATE: 14 BRPM | HEIGHT: 63 IN | OXYGEN SATURATION: 95 % | WEIGHT: 132 LBS | HEART RATE: 85 BPM | DIASTOLIC BLOOD PRESSURE: 82 MMHG | TEMPERATURE: 97.5 F | BODY MASS INDEX: 23.39 KG/M2 | SYSTOLIC BLOOD PRESSURE: 142 MMHG

## 2022-05-10 DIAGNOSIS — Z13.29 SCREENING FOR THYROID DISORDER: ICD-10-CM

## 2022-05-10 DIAGNOSIS — R94.31 ABNORMAL ELECTROCARDIOGRAM (ECG) (EKG): ICD-10-CM

## 2022-05-10 DIAGNOSIS — I10 PRIMARY HYPERTENSION: ICD-10-CM

## 2022-05-10 DIAGNOSIS — F17.200 TOBACCO DEPENDENCE: ICD-10-CM

## 2022-05-10 DIAGNOSIS — R51.9 SINUS HEADACHE: ICD-10-CM

## 2022-05-10 DIAGNOSIS — E78.5 DYSLIPIDEMIA: ICD-10-CM

## 2022-05-10 DIAGNOSIS — R07.89 OTHER CHEST PAIN: ICD-10-CM

## 2022-05-10 DIAGNOSIS — G44.011 INTRACTABLE EPISODIC CLUSTER HEADACHE: ICD-10-CM

## 2022-05-10 PROCEDURE — 99214 OFFICE O/P EST MOD 30 MIN: CPT | Performed by: INTERNAL MEDICINE

## 2022-05-10 RX ORDER — SUMATRIPTAN 50 MG/1
50 TABLET, FILM COATED ORAL
Qty: 10 TABLET | Refills: 0 | Status: SHIPPED | OUTPATIENT
Start: 2022-05-10 | End: 2022-05-13 | Stop reason: SDUPTHER

## 2022-05-10 RX ORDER — VALSARTAN 160 MG/1
160 TABLET ORAL DAILY
Qty: 90 TABLET | Refills: 1 | Status: SHIPPED | OUTPATIENT
Start: 2022-05-10 | End: 2022-08-17 | Stop reason: SDUPTHER

## 2022-05-11 ENCOUNTER — HOSPITAL ENCOUNTER (OUTPATIENT)
Dept: CARDIOLOGY | Facility: MEDICAL CENTER | Age: 52
End: 2022-05-11
Attending: INTERNAL MEDICINE
Payer: COMMERCIAL

## 2022-05-11 DIAGNOSIS — R07.89 OTHER CHEST PAIN: ICD-10-CM

## 2022-05-11 DIAGNOSIS — R94.31 ABNORMAL ELECTROCARDIOGRAM (ECG) (EKG): ICD-10-CM

## 2022-05-11 DIAGNOSIS — I10 PRIMARY HYPERTENSION: ICD-10-CM

## 2022-05-11 LAB
LV EJECT FRACT  99904: 60
LV EJECT FRACT MOD 2C 99903: 70.18
LV EJECT FRACT MOD 4C 99902: 64.61
LV EJECT FRACT MOD BP 99901: 68.24

## 2022-05-11 PROCEDURE — 93306 TTE W/DOPPLER COMPLETE: CPT | Mod: 26 | Performed by: INTERNAL MEDICINE

## 2022-05-11 PROCEDURE — 93306 TTE W/DOPPLER COMPLETE: CPT

## 2022-05-11 NOTE — PROGRESS NOTES
"Established Patient    Chief Complaint   Patient presents with   • Results   • Medication Refill     Sumatriptan        Subjective:     HPI:   Jil presents today with the following.    Patient Active Problem List    Diagnosis Date Noted   • Other chest pain 05/10/2022   • Abnormal electrocardiogram (ECG) (EKG) 05/10/2022   • Tobacco dependence 04/11/2022   • Cluster headaches 04/11/2022   • Arthritis of left temporomandibular joint 04/11/2022   • Chronic pansinusitis 09/15/2021   • Hypertrophy of nasal turbinates 09/15/2021   • Primary insomnia 06/11/2021   • Hypertension 05/27/2021   • Teeth problem 05/27/2021   • Chronic left lower quadrant pain 05/27/2021       Current Outpatient Medications on File Prior to Visit   Medication Sig Dispense Refill   • propranolol (INDERAL) 20 MG Tab Take 1 Tablet by mouth 2 times a day. 180 Tablet 0     No current facility-administered medications on file prior to visit.       Allergies, past medical history, past surgical history, family history, social history reviewed and updated    ROS:  All other systems reviewed and are negative except as stated in the HPI       Physical Exam:     /82 (BP Location: Left arm, Patient Position: Sitting, BP Cuff Size: Adult)   Pulse 85   Temp 36.4 °C (97.5 °F) (Temporal)   Resp 14   Ht 1.6 m (5' 3\")   Wt 59.9 kg (132 lb)   SpO2 95%   BMI 23.38 kg/m²   General: Normal appearing. No distress.  Pulmonary: Clear to ausculation.  Normal effort.   Cardiovascular: Regular rate and rhythm    Assessment and Plan:     51 y.o. female with the following issues.    1. Other chest pain  2. Abnormal electrocardiogram (ECG) (EKG)  - Cardiac Stress Test Treadmill Only  - EC-ECHOCARDIOGRAM COMPLETE W/O CONT; Future  4. Primary hypertension  Dyslipidemia  - valsartan (DIOVAN) 160 MG Tab; Take 1 Tablet by mouth every day.  Dispense: 90 Tablet; Refill: 1    > Patient had EKG done with T inversion ischemic changes in the anterolateral leads, patient " also reported of left-sided chest pain radiated to the left upper extremity, usually at rest, patient is not practicing exercise to experience any chest pain, denied having any alleviating or aggravating factor, associated sometimes with skipped beating,  - Blood pressure still not well controlled, she is taking valsartan 80 mg daily, and increase today to 160 mg daily and follow-up with me in 3-week    -discussion in details on target blood pressure goal, advised monitoring BP closely at home.  Have BP log to present at follow-up visit or send through my chart.   -Advised low-salt diet, healthy dietary option include plenty of vegetable, reduce refine carbohydrates and sugar, regular exercise as tolerated, healthy fat/protein/carbs, also avoid alcohol, no NSAIDs  If symptoms worsen or persist patient can return to clinic for reevaluation.  Red flags and strict emergency room precautions discussed.  Discussed side effects of medication. Patient understand    > Educated in detail regarding cholesterol management as well    The 10-year ASCVD risk score (Kingston ABRAM Jr., et al., 2013) is: 2.9%    3. Screening for thyroid disorder  - FREE THYROXINE; Future  - TSH; Future    5. Intractable episodic cluster headache  She has classical symptom for cluster headache, she was seen by neurology and prescribe verapamil 100 mg SR daily, patient could not afford this medicine because of insurance coverage and reported very expensive, she would like to be seen by another neurologist for further evaluation  - Referral to Neurology    6. Tobacco dependence  She is trying to reduce the amount, congratulated patient regarding her efforts    Follow Up:      Return in about 3 weeks (around 5/31/2022).  Hypertension management  Please note that this dictation was created using voice recognition software. I have made every reasonable attempt to correct obvious errors, but I expect that there are errors of grammar and possibly content that I  did not discover before finalizing the note.    Signed by: Rubén Montana M.D.

## 2022-05-11 NOTE — PATIENT INSTRUCTIONS
"Cholesterol problem treatment:  # Resveratrol ( berries, dark chocolate, unsweetened cocoa, 90% or  above)   Garlic, onion, leeks   Probiotics:  billions  Fermented foods= sauerkraut, yogurt \" plain, organic\", kefir, apple cider vinegar with mother, kimchi >> organic   Nuts and Seeds \" also lisa and Flax seeds\" >> unsalted, un-roasted, raw, organic  Fish oil (cod liver oil)  Turmeric, shaun, black pepper, Cinnamon combination supplement   Green Tea Matcha Organic >>2-3 cups in day    "

## 2022-05-13 DIAGNOSIS — R51.9 SINUS HEADACHE: ICD-10-CM

## 2022-05-16 RX ORDER — SUMATRIPTAN 50 MG/1
50 TABLET, FILM COATED ORAL
Qty: 10 TABLET | Refills: 0 | Status: SHIPPED | OUTPATIENT
Start: 2022-05-16 | End: 2023-09-18 | Stop reason: SDUPTHER

## 2022-08-17 DIAGNOSIS — I10 PRIMARY HYPERTENSION: ICD-10-CM

## 2022-08-18 RX ORDER — VALSARTAN 160 MG/1
160 TABLET ORAL DAILY
Qty: 90 TABLET | Refills: 1 | Status: SHIPPED | OUTPATIENT
Start: 2022-08-18 | End: 2023-02-16 | Stop reason: SDUPTHER

## 2023-02-16 DIAGNOSIS — I10 PRIMARY HYPERTENSION: ICD-10-CM

## 2023-02-17 RX ORDER — VALSARTAN 160 MG/1
160 TABLET ORAL DAILY
Qty: 90 TABLET | Refills: 1 | Status: SHIPPED | OUTPATIENT
Start: 2023-02-17 | End: 2023-08-19 | Stop reason: SDUPTHER

## 2023-04-06 ENCOUNTER — APPOINTMENT (OUTPATIENT)
Dept: RADIOLOGY | Facility: MEDICAL CENTER | Age: 53
End: 2023-04-06
Attending: EMERGENCY MEDICINE
Payer: COMMERCIAL

## 2023-04-06 ENCOUNTER — HOSPITAL ENCOUNTER (EMERGENCY)
Facility: MEDICAL CENTER | Age: 53
End: 2023-04-06
Attending: EMERGENCY MEDICINE
Payer: COMMERCIAL

## 2023-04-06 VITALS
DIASTOLIC BLOOD PRESSURE: 93 MMHG | HEART RATE: 73 BPM | RESPIRATION RATE: 15 BRPM | TEMPERATURE: 97.7 F | SYSTOLIC BLOOD PRESSURE: 151 MMHG | OXYGEN SATURATION: 97 % | WEIGHT: 126.98 LBS | BODY MASS INDEX: 22.5 KG/M2 | HEIGHT: 63 IN

## 2023-04-06 DIAGNOSIS — M94.0 COSTOCHONDRITIS: ICD-10-CM

## 2023-04-06 DIAGNOSIS — S20.219A CONTUSION OF STERNUM, INITIAL ENCOUNTER: ICD-10-CM

## 2023-04-06 DIAGNOSIS — R07.9 ACUTE CHEST PAIN: ICD-10-CM

## 2023-04-06 LAB — EKG IMPRESSION: NORMAL

## 2023-04-06 PROCEDURE — 99283 EMERGENCY DEPT VISIT LOW MDM: CPT

## 2023-04-06 PROCEDURE — 71250 CT THORAX DX C-: CPT

## 2023-04-06 PROCEDURE — 93005 ELECTROCARDIOGRAM TRACING: CPT

## 2023-04-06 PROCEDURE — 93005 ELECTROCARDIOGRAM TRACING: CPT | Performed by: EMERGENCY MEDICINE

## 2023-04-06 RX ORDER — HYDROCODONE BITARTRATE AND ACETAMINOPHEN 5; 325 MG/1; MG/1
1 TABLET ORAL EVERY 4 HOURS PRN
Qty: 10 TABLET | Refills: 0 | Status: SHIPPED | OUTPATIENT
Start: 2023-04-06 | End: 2023-04-11

## 2023-04-06 NOTE — ED PROVIDER NOTES
ER Provider Note    Scribed for Mathew Wright M.D. by Harini Saxena. 4/6/2023  11:42 AM    Primary Care Provider: Rubén Montana M.D.    CHIEF COMPLAINT  Chief Complaint   Patient presents with    Chest Pain       EXTERNAL RECORDS REVIEWED  None    HPI/ROS  OUTSIDE HISTORIAN(S):  None    LIMITATION TO HISTORY   None    Jil Martinez is a 52 y.o. female who presents to the Emergency Department for chest pain onset 4 days ago. She states she was trying to open a large container of dog food when the metal bar hit her in the chest. Since then, she has had chest pain that runs across her chest and sometimes to the back. She reports hearing a crackling and popping sound when walking. She has associated shortness of breath. She states she took Ibuprofen prior to arrival with minimal relief. No alleviating or exacerbating factors were identified.    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       SURGICAL HISTORY  Past Surgical History:   Procedure Laterality Date    ABDOMINAL HYSTERECTOMY TOTAL      complete    GYN SURGERY      hysterectomy    OTHER      bilateral large toe surgeries    OTHER ORTHOPEDIC SURGERY      bilateral wrists - membranes filled with fluid    URETEROSCOPY         FAMILY HISTORY  Family History   Problem Relation Age of Onset    Hypertension Mother     Heart Disease Father     Stroke Father     Hypertension Father     Cancer Maternal Uncle         brain    Diabetes Maternal Grandmother     Hypertension Maternal Grandmother     Hypertension Maternal Grandfather     Hypertension Paternal Grandmother     Hypertension Paternal Grandfather     Hyperlipidemia Neg Hx        SOCIAL HISTORY   reports that she quit smoking about 1 years ago. She has a 10.00 pack-year smoking history. She has never used smokeless tobacco. She reports current alcohol  "use. She reports that she does not use drugs.    CURRENT MEDICATIONS  Previous Medications    PROPRANOLOL (INDERAL) 20 MG TAB    Take 1 Tablet by mouth 2 times a day.    SUMATRIPTAN (IMITREX) 50 MG TAB    Take 1 Tablet by mouth one time as needed for Migraine for up to 1 dose.    VALSARTAN (DIOVAN) 160 MG TAB    Take 1 Tablet by mouth every day.       ALLERGIES  Fentanyl and Methylprednisolone sodium succ    PHYSICAL EXAM  BP (!) 151/98   Pulse 72   Temp 36.2 °C (97.1 °F) (Temporal)   Resp 17   Ht 1.6 m (5' 3\")   Wt 57.6 kg (126 lb 15.8 oz)   SpO2 98%   BMI 22.49 kg/m²   Nursing note and vitals reviewed.  Constitutional: Well-developed and well-nourished. No distress.   HENT: Head is normocephalic and atraumatic. Oropharynx is clear and moist without exudate or erythema.   Eyes: Pupils are equal, round, and reactive to light. Conjunctiva are normal.   Cardiovascular: Normal rate and regular rhythm. No murmur heard. Normal radial pulses.  Pulmonary/Chest: Breath sounds normal. No wheezes or rales. Tenderness diffusely to the sternum. No crepitus.  Abdominal: Soft and non-tender. No distention    Musculoskeletal: Extremities exhibit normal range of motion without edema or tenderness.   Neurological: Awake, alert and oriented to person, place, and time. No focal deficits noted.  Skin: Skin is warm and dry. No rash.   Psychiatric: Normal mood and affect. Appropriate for clinical situation.    DIAGNOSTIC STUDIES & PROCEDURES    Labs:   Results for orders placed or performed during the hospital encounter of 23   EKG   Result Value Ref Range    Report       [facility] Emergency Dept.    Test Date:  2023  Pt Name:    NANCY ADRIAN                 Department: ER  MRN:        [id number]                      Room:       [facility]  Gender:     Female                       Technician: [id number]  :        1970                   Requested By:ER TRIAGE PROTOCOL  Order #:    [id number]              "       Reading MD: ERIBERTO GOLD MD    Measurements  Intervals                                Axis  Rate:       78                           P:          56  ND:         132                          QRS:        -41  QRSD:       100                          T:          66  QT:         396  QTc:        452    Interpretive Statements  Sinus rhythm  Left axis deviation  Borderline abnrm T, anterolateral leads  Compared to ECG 2022 09:14:53  Left-axis deviation now present  Sinus bradycardia no longer present  Left anterior fascicular block no longer present  Right ventricular hypertrophy no longer present  Possible ischemia no lo nger present  Electronically Signed On 2023 13:33:44 PDT by ERIBERTO GOLD MD       All labs reviewed by me.    EK Lead EKG interpreted by me as shown above.     Radiology:   The attending Emergency Physician has independently interpreted the diagnostic imaging associated with this visit and is awaiting the final reading from the radiologist, which will be displayed below.    Preliminary interpretation is a follows: No evidence of sternal fracture  Radiologist interpretation:     CT-CHEST (THORAX) W/O   Final Result      Negative CT scan of the chest without contrast aside from emphysema. No evidence of acute hepatic injury      Fleischner Society pulmonary nodule recommendations:   Not Applicable            COURSE & MEDICAL DECISION MAKING    ED Observation Status? Yes; I am placing the patient in to an observation status due to a diagnostic uncertainty as well as therapeutic intensity. Patient placed in observation status at 12:03 PM, 2023.     Observation plan is as follows: imaging and reevaluation     Upon Reevaluation, the patient's condition has: Improved; and will be discharged.    Patient discharged from ED Observation status at 1:46 PM (Time) 2023 (Date).     INITIAL ASSESSMENT AND PLAN  Care Narrative:     11:42 AM - Patient seen and examined at bedside.  Discussed plan of care, including labs and radiology. Patient agrees to the plan of care. Ordered for EKG and CT-Chest (Thorax) without Contrast to evaluate her symptoms. Differential diagnoses include but are not limited to: contusion vs fracture    1:46 PM - Patient was reevaluated at bedside. I updated the patient on their lab and imaging results. Patient had the opportunity to ask any questions. The plan for discharge was discussed with them and she was told to return for any new or worsening symptoms. She was also informed of the plans for follow up. Patient is understanding and agreeable to the plan for discharge.      Patient presents today after direct trauma to the middle of the chest.  She felt some popping and crackling in the middle of her chest.  Was concern for possible pneumothorax, rib fracture, or sternal fracture.  CT scan was obtained due to the decreased sensitivity of the chest x-ray to detect sternal fracture.  Chest CT did not demonstrate any evidence of pneumothorax, hemothorax, displaced rib fracture or sternal fracture.               DISPOSITION AND DISCUSSIONS  I have discussed management of the patient with the following physicians and ROCK's: None    Discussion of management with other QHP or appropriate source(s): None     Escalation of care considered, and ultimately not performed: blood analysis.    Barriers to care at this time, including but not limited to: None    Decision tools and prescription drugs considered including, but not limited to: Medication modification   .    I reviewed prescription monitoring program for patient's narcotic use before prescribing a scheduled drug.The patient will not drink alcohol nor drive with prescribed medications. The patient will return for new or worsening symptoms and is stable at the time of discharge.    The patient is referred to a primary physician for blood pressure management, diabetic screening, and for all other preventative health  concerns.    In prescribing controlled substances to this patient, I certify that I have obtained and reviewed the medical history of Jil Martinez. I have also made a good xenia effort to obtain applicable records from other providers who have treated the patient and records did not demonstrate any increased risk of substance abuse that would prevent me from prescribing controlled substances.     I have conducted a physical exam and documented it. I have reviewed Ms. Martinez’s prescription history as maintained by the [place] Prescription Monitoring Program.     I have assessed the patient’s risk for abuse, dependency, and addiction using the validated Opioid Risk Tool available at https://www.mdcalc.com/otnven-lgxd-ywdv-ort-narcotic-abuse.     Given the above, I believe the benefits of controlled substance therapy outweigh the risks. The reasons for prescribing controlled substances include non-narcotic, oral analgesic alternatives have been inadequate for pain control. Accordingly, I have discussed the risk and benefits, treatment plan, and alternative therapies with the patient.      DISPOSITION:  Patient will be discharged home in stable condition.    FOLLOW UP:  Rubén Montana M.D.  [address] [place] [id number] [phone]    Schedule an appointment as soon as possible for a visit       [facility], Emergency Dept  [address] [facility] [address] [phone]    If symptoms worsen      OUTPATIENT MEDICATIONS:  New Prescriptions    HYDROCODONE-ACETAMINOPHEN (NORCO) 5-325 MG TAB PER TABLET    Take 1 Tablet by mouth every four hours as needed (pain) for up to 5 days.        FINAL IMPRESSION   1. Acute chest pain    2. Contusion of sternum, initial encounter    3. Costochondritis      Harini FOLEY), am scribing for, and in the presence of, Mathew Wright M.D..    Electronically signed by: Harini Leone), 4/6/2023    Mathew FOLEY  TR Wright. personally performed the services described in this documentation, as scribed by Harini Saxena in my presence, and it is both accurate and complete.    The note accurately reflects work and decisions made by me.  Mathew Wright M.D.  4/6/2023  2:04 PM

## 2023-04-06 NOTE — ED NOTES
Provided pt with written and verbal instructions regarding follow-up, activity, and RX. Controlled substance discussed with client. Client agrees to abide by controlled substance contract. All questions answered and patient verbalized understanding. Pt ambulated out of unit with steady gait.

## 2023-04-06 NOTE — ED NOTES
Pt to [facility] from [facility]. Pt is A&Ox4 and awake in bed. Pt placed on cardiac monitor, pulse ox, and automatic BP. VSS, saturating above 95% on RA, SR in the 70s. Call light within reach.

## 2023-04-06 NOTE — ED TRIAGE NOTES
"Vitals:    04/06/23 1013   BP: (!) 151/98   Pulse: 72   Resp: 17   Temp: 36.2 °C (97.1 °F)   SpO2: 98%     Chief Complaint   Patient presents with    Chest Pain     Pt reports on Sunday she was opening a 5 gallon bucket and the metal handle went in to her chest. She felt \"pops and cracks\" in her sternum and has pain since despite taking ibuprofen.     Pt is ambulatory to and from triage and is alert and oriented x 4.     "

## 2023-06-09 ENCOUNTER — PATIENT MESSAGE (OUTPATIENT)
Dept: HEALTH INFORMATION MANAGEMENT | Facility: OTHER | Age: 53
End: 2023-06-09

## 2023-06-09 ENCOUNTER — DOCUMENTATION (OUTPATIENT)
Dept: HEALTH INFORMATION MANAGEMENT | Facility: OTHER | Age: 53
End: 2023-06-09
Payer: COMMERCIAL

## 2023-08-04 ENCOUNTER — TELEPHONE (OUTPATIENT)
Dept: HEALTH INFORMATION MANAGEMENT | Facility: OTHER | Age: 53
End: 2023-08-04
Payer: COMMERCIAL

## 2023-08-19 DIAGNOSIS — I10 PRIMARY HYPERTENSION: ICD-10-CM

## 2023-08-23 RX ORDER — VALSARTAN 160 MG/1
160 TABLET ORAL DAILY
Qty: 90 TABLET | Refills: 1 | Status: SHIPPED | OUTPATIENT
Start: 2023-08-23 | End: 2023-09-18 | Stop reason: SDUPTHER

## 2023-09-15 SDOH — ECONOMIC STABILITY: TRANSPORTATION INSECURITY
IN THE PAST 12 MONTHS, HAS LACK OF TRANSPORTATION KEPT YOU FROM MEETINGS, WORK, OR FROM GETTING THINGS NEEDED FOR DAILY LIVING?: NO

## 2023-09-15 SDOH — ECONOMIC STABILITY: TRANSPORTATION INSECURITY
IN THE PAST 12 MONTHS, HAS LACK OF RELIABLE TRANSPORTATION KEPT YOU FROM MEDICAL APPOINTMENTS, MEETINGS, WORK OR FROM GETTING THINGS NEEDED FOR DAILY LIVING?: NO

## 2023-09-15 SDOH — HEALTH STABILITY: PHYSICAL HEALTH: ON AVERAGE, HOW MANY MINUTES DO YOU ENGAGE IN EXERCISE AT THIS LEVEL?: 0 MIN

## 2023-09-15 SDOH — ECONOMIC STABILITY: HOUSING INSECURITY
IN THE LAST 12 MONTHS, WAS THERE A TIME WHEN YOU DID NOT HAVE A STEADY PLACE TO SLEEP OR SLEPT IN A SHELTER (INCLUDING NOW)?: NO

## 2023-09-15 SDOH — ECONOMIC STABILITY: TRANSPORTATION INSECURITY
IN THE PAST 12 MONTHS, HAS THE LACK OF TRANSPORTATION KEPT YOU FROM MEDICAL APPOINTMENTS OR FROM GETTING MEDICATIONS?: NO

## 2023-09-15 SDOH — HEALTH STABILITY: MENTAL HEALTH
STRESS IS WHEN SOMEONE FEELS TENSE, NERVOUS, ANXIOUS, OR CAN'T SLEEP AT NIGHT BECAUSE THEIR MIND IS TROUBLED. HOW STRESSED ARE YOU?: VERY MUCH

## 2023-09-15 SDOH — ECONOMIC STABILITY: FOOD INSECURITY: WITHIN THE PAST 12 MONTHS, THE FOOD YOU BOUGHT JUST DIDN'T LAST AND YOU DIDN'T HAVE MONEY TO GET MORE.: NEVER TRUE

## 2023-09-15 SDOH — ECONOMIC STABILITY: INCOME INSECURITY: HOW HARD IS IT FOR YOU TO PAY FOR THE VERY BASICS LIKE FOOD, HOUSING, MEDICAL CARE, AND HEATING?: SOMEWHAT HARD

## 2023-09-15 SDOH — ECONOMIC STABILITY: INCOME INSECURITY: IN THE LAST 12 MONTHS, WAS THERE A TIME WHEN YOU WERE NOT ABLE TO PAY THE MORTGAGE OR RENT ON TIME?: YES

## 2023-09-15 SDOH — ECONOMIC STABILITY: FOOD INSECURITY: WITHIN THE PAST 12 MONTHS, YOU WORRIED THAT YOUR FOOD WOULD RUN OUT BEFORE YOU GOT MONEY TO BUY MORE.: SOMETIMES TRUE

## 2023-09-15 SDOH — ECONOMIC STABILITY: HOUSING INSECURITY: IN THE LAST 12 MONTHS, HOW MANY PLACES HAVE YOU LIVED?: 1

## 2023-09-15 SDOH — ECONOMIC STABILITY: HOUSING INSECURITY
IN THE LAST 12 MONTHS, WAS THERE A TIME WHEN YOU DID NOT HAVE A STEADY PLACE TO SLEEP OR SLEPT IN A SHELTER (INCLUDING NOW)?: YES

## 2023-09-15 SDOH — HEALTH STABILITY: PHYSICAL HEALTH: ON AVERAGE, HOW MANY DAYS PER WEEK DO YOU ENGAGE IN MODERATE TO STRENUOUS EXERCISE (LIKE A BRISK WALK)?: 0 DAYS

## 2023-09-15 ASSESSMENT — SOCIAL DETERMINANTS OF HEALTH (SDOH)
HOW OFTEN DO YOU GET TOGETHER WITH FRIENDS OR RELATIVES?: ONCE A WEEK
WITHIN THE PAST 12 MONTHS, YOU WORRIED THAT YOUR FOOD WOULD RUN OUT BEFORE YOU GOT THE MONEY TO BUY MORE: SOMETIMES TRUE
DO YOU BELONG TO ANY CLUBS OR ORGANIZATIONS SUCH AS CHURCH GROUPS UNIONS, FRATERNAL OR ATHLETIC GROUPS, OR SCHOOL GROUPS?: NO
HOW HARD IS IT FOR YOU TO PAY FOR THE VERY BASICS LIKE FOOD, HOUSING, MEDICAL CARE, AND HEATING?: SOMEWHAT HARD
HOW OFTEN DO YOU GET TOGETHER WITH FRIENDS OR RELATIVES?: ONCE A WEEK
HOW OFTEN DO YOU ATTENT MEETINGS OF THE CLUB OR ORGANIZATION YOU BELONG TO?: NEVER
IN A TYPICAL WEEK, HOW MANY TIMES DO YOU TALK ON THE PHONE WITH FAMILY, FRIENDS, OR NEIGHBORS?: THREE TIMES A WEEK
HOW OFTEN DO YOU ATTENT MEETINGS OF THE CLUB OR ORGANIZATION YOU BELONG TO?: NEVER
HOW OFTEN DO YOU ATTEND CHURCH OR RELIGIOUS SERVICES?: 1 TO 4 TIMES PER YEAR
HOW OFTEN DO YOU ATTEND CHURCH OR RELIGIOUS SERVICES?: 1 TO 4 TIMES PER YEAR
HOW OFTEN DO YOU HAVE A DRINK CONTAINING ALCOHOL: 2-3 TIMES A WEEK
DO YOU BELONG TO ANY CLUBS OR ORGANIZATIONS SUCH AS CHURCH GROUPS UNIONS, FRATERNAL OR ATHLETIC GROUPS, OR SCHOOL GROUPS?: NO
IN A TYPICAL WEEK, HOW MANY TIMES DO YOU TALK ON THE PHONE WITH FAMILY, FRIENDS, OR NEIGHBORS?: THREE TIMES A WEEK
HOW OFTEN DO YOU HAVE SIX OR MORE DRINKS ON ONE OCCASION: NEVER
HOW MANY DRINKS CONTAINING ALCOHOL DO YOU HAVE ON A TYPICAL DAY WHEN YOU ARE DRINKING: 1 OR 2

## 2023-09-15 ASSESSMENT — LIFESTYLE VARIABLES
HOW OFTEN DO YOU HAVE A DRINK CONTAINING ALCOHOL: 2-3 TIMES A WEEK
HOW MANY STANDARD DRINKS CONTAINING ALCOHOL DO YOU HAVE ON A TYPICAL DAY: 1 OR 2
AUDIT-C TOTAL SCORE: 3
SKIP TO QUESTIONS 9-10: 1
HOW OFTEN DO YOU HAVE SIX OR MORE DRINKS ON ONE OCCASION: NEVER

## 2023-09-18 ENCOUNTER — OFFICE VISIT (OUTPATIENT)
Dept: MEDICAL GROUP | Facility: PHYSICIAN GROUP | Age: 53
End: 2023-09-18
Payer: COMMERCIAL

## 2023-09-18 VITALS
OXYGEN SATURATION: 96 % | TEMPERATURE: 98.6 F | WEIGHT: 123 LBS | RESPIRATION RATE: 16 BRPM | HEIGHT: 63 IN | DIASTOLIC BLOOD PRESSURE: 86 MMHG | HEART RATE: 68 BPM | BODY MASS INDEX: 21.79 KG/M2 | SYSTOLIC BLOOD PRESSURE: 134 MMHG

## 2023-09-18 DIAGNOSIS — G44.011 INTRACTABLE EPISODIC CLUSTER HEADACHE: ICD-10-CM

## 2023-09-18 DIAGNOSIS — Z12.11 SCREENING FOR COLORECTAL CANCER: ICD-10-CM

## 2023-09-18 DIAGNOSIS — Z12.12 SCREENING FOR COLORECTAL CANCER: ICD-10-CM

## 2023-09-18 DIAGNOSIS — Z13.6 SCREENING FOR CARDIOVASCULAR CONDITION: ICD-10-CM

## 2023-09-18 DIAGNOSIS — I10 PRIMARY HYPERTENSION: ICD-10-CM

## 2023-09-18 DIAGNOSIS — Z12.31 ENCOUNTER FOR SCREENING MAMMOGRAM FOR BREAST CANCER: ICD-10-CM

## 2023-09-18 DIAGNOSIS — Z11.59 NEED FOR HEPATITIS C SCREENING TEST: ICD-10-CM

## 2023-09-18 DIAGNOSIS — R51.9 SINUS HEADACHE: ICD-10-CM

## 2023-09-18 DIAGNOSIS — Z83.3 FAMILY HISTORY OF DIABETES MELLITUS (DM): ICD-10-CM

## 2023-09-18 DIAGNOSIS — E55.9 VITAMIN D DEFICIENCY: ICD-10-CM

## 2023-09-18 DIAGNOSIS — Z76.89 ENCOUNTER TO ESTABLISH CARE: ICD-10-CM

## 2023-09-18 DIAGNOSIS — Z13.29 THYROID DISORDER SCREEN: ICD-10-CM

## 2023-09-18 DIAGNOSIS — Z13.0 SCREENING FOR DEFICIENCY ANEMIA: ICD-10-CM

## 2023-09-18 DIAGNOSIS — E78.5 DYSLIPIDEMIA: ICD-10-CM

## 2023-09-18 PROBLEM — G89.29 CHRONIC LEFT LOWER QUADRANT PAIN: Status: RESOLVED | Noted: 2021-05-27 | Resolved: 2023-09-18

## 2023-09-18 PROBLEM — R10.32 CHRONIC LEFT LOWER QUADRANT PAIN: Status: RESOLVED | Noted: 2021-05-27 | Resolved: 2023-09-18

## 2023-09-18 PROBLEM — R07.89 OTHER CHEST PAIN: Status: RESOLVED | Noted: 2022-05-10 | Resolved: 2023-09-18

## 2023-09-18 PROBLEM — G44.209 TENSION-TYPE HEADACHE, NOT INTRACTABLE: Status: ACTIVE | Noted: 2023-09-18

## 2023-09-18 PROBLEM — J34.3 HYPERTROPHY OF NASAL TURBINATES: Status: RESOLVED | Noted: 2021-09-15 | Resolved: 2023-09-18

## 2023-09-18 PROBLEM — G44.209 TENSION-TYPE HEADACHE, NOT INTRACTABLE: Status: RESOLVED | Noted: 2023-09-18 | Resolved: 2023-09-18

## 2023-09-18 PROBLEM — K08.9 TEETH PROBLEM: Status: RESOLVED | Noted: 2021-05-27 | Resolved: 2023-09-18

## 2023-09-18 PROBLEM — J32.4 CHRONIC PANSINUSITIS: Status: RESOLVED | Noted: 2021-09-15 | Resolved: 2023-09-18

## 2023-09-18 PROCEDURE — 3075F SYST BP GE 130 - 139MM HG: CPT

## 2023-09-18 PROCEDURE — 99214 OFFICE O/P EST MOD 30 MIN: CPT

## 2023-09-18 PROCEDURE — 3079F DIAST BP 80-89 MM HG: CPT

## 2023-09-18 RX ORDER — VALSARTAN 160 MG/1
160 TABLET ORAL DAILY
Qty: 90 TABLET | Refills: 3 | Status: SHIPPED | OUTPATIENT
Start: 2023-09-18 | End: 2024-01-09 | Stop reason: SDUPTHER

## 2023-09-18 RX ORDER — SUMATRIPTAN 50 MG/1
50 TABLET, FILM COATED ORAL
Qty: 10 TABLET | Refills: 0 | Status: SHIPPED | OUTPATIENT
Start: 2023-09-18

## 2023-09-18 RX ORDER — VERAPAMIL HYDROCHLORIDE 100 MG/1
1 CAPSULE, EXTENDED RELEASE ORAL NIGHTLY
Qty: 90 CAPSULE | Refills: 3 | Status: SHIPPED | OUTPATIENT
Start: 2023-09-18

## 2023-09-18 RX ORDER — VERAPAMIL HYDROCHLORIDE 100 MG/1
1 CAPSULE, EXTENDED RELEASE ORAL NIGHTLY
COMMUNITY
End: 2023-09-18 | Stop reason: SDUPTHER

## 2023-09-18 ASSESSMENT — ENCOUNTER SYMPTOMS
PALPITATIONS: 0
BLURRED VISION: 0
HEADACHES: 0
DIZZINESS: 0

## 2023-09-18 ASSESSMENT — PATIENT HEALTH QUESTIONNAIRE - PHQ9: CLINICAL INTERPRETATION OF PHQ2 SCORE: 0

## 2023-09-18 NOTE — ASSESSMENT & PLAN NOTE
Chronic, stable. Has been seeing neurology for headaches. Doing well with verapamil 100 mg nightly, sumatriptan 50 mg as needed. Continue these medications.

## 2023-09-18 NOTE — PROGRESS NOTES
"Subjective:     CC: Diagnoses of Primary hypertension, Intractable episodic cluster headache, Sinus headache, Dyslipidemia, Thyroid disorder screen, Vitamin D deficiency, Screening for cardiovascular condition, Screening for deficiency anemia, Need for hepatitis C screening test, Encounter to establish care, Family history of diabetes mellitus (DM), Encounter for screening mammogram for breast cancer, and Screening for colorectal cancer were pertinent to this visit.    Pt presents today to establish care with me, she is doing well and requesting refills for her medications.     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   HPI:   Jil presents today with    Problem   Cluster Headaches   Hypertension     She was on telmisartan, previous records mention nausea and vomiting, she was on lisinopril with possibility of cough as a side effect     Tension-Type Headache, Not Intractable (Resolved)   Other Chest Pain (Resolved)   Chronic Pansinusitis (Resolved)   Hypertrophy of Nasal Turbinates (Resolved)   Teeth Problem (Resolved)   Chronic Left Lower Quadrant Pain (Resolved)       ROS:  Review of Systems   Eyes:  Negative for blurred vision.   Cardiovascular:  Negative for chest pain, palpitations and leg swelling.   Neurological:  Negative for dizziness and headaches (well controlled).   All other systems reviewed and are negative.      Objective:     Exam:  /86 (BP Location: Right arm, Patient Position: Sitting, BP Cuff Size: Small adult)   Pulse 68   Temp 37 °C (98.6 °F) (Temporal)   Resp 16   Ht 1.6 m (5' 3\")   Wt 55.8 kg (123 lb)   SpO2 96%   BMI 21.79 kg/m²  Body mass index is 21.79 kg/m².    Physical Exam  Vitals reviewed.   Constitutional:       General: She is not in acute distress.     Appearance: Normal appearance. She is well-groomed. She is not ill-appearing.   HENT:      Head: Normocephalic and atraumatic.      Right Ear: Tympanic membrane, ear canal and external ear normal. "      Left Ear: Tympanic membrane, ear canal and external ear normal.      Nose: Nose normal.      Mouth/Throat:      Mouth: Mucous membranes are moist.      Pharynx: Oropharynx is clear.   Eyes:      Extraocular Movements: Extraocular movements intact.      Conjunctiva/sclera: Conjunctivae normal.      Pupils: Pupils are equal, round, and reactive to light.   Neck:      Thyroid: No thyromegaly.      Trachea: Trachea normal.   Cardiovascular:      Rate and Rhythm: Normal rate and regular rhythm.      Pulses: Normal pulses.      Heart sounds: Normal heart sounds. No murmur heard.  Pulmonary:      Effort: Pulmonary effort is normal. No respiratory distress.      Breath sounds: Normal breath sounds. No wheezing.   Abdominal:      General: Bowel sounds are normal.   Musculoskeletal:         General: No swelling, tenderness or deformity. Normal range of motion.      Cervical back: Full passive range of motion without pain.   Lymphadenopathy:      Cervical: No cervical adenopathy.   Skin:     General: Skin is warm and dry.      Capillary Refill: Capillary refill takes less than 2 seconds.   Neurological:      General: No focal deficit present.      Mental Status: She is alert and oriented to person, place, and time. Mental status is at baseline.      Cranial Nerves: No cranial nerve deficit.      Sensory: No sensory deficit.      Motor: No weakness.   Psychiatric:         Mood and Affect: Mood normal.         Behavior: Behavior normal.       Assessment & Plan:     53 y.o. female with the following -     Problem List Items Addressed This Visit       Hypertension     Chronic, stable. Reports home blood pressure readings 130s/70-90s.   134/86 in clinic today. No vision changes, dizziness, chest pain, swelling LE. Does report intermittent cluster headaches which generally occur at night.  Continue valsartan 160 mg daily.  Consider adding ccb or thiazide diuretic  Goal <130/80         Relevant Medications    Verapamil HCl CR  100 MG CAPSULE SR 24 HR    valsartan (DIOVAN) 160 MG Tab    Cluster headaches     Chronic, stable. Has been seeing neurology for headaches. Doing well with verapamil 100 mg nightly, sumatriptan 50 mg as needed. Continue these medications.         Relevant Medications    Verapamil HCl  MG CAPSULE SR 24 HR    SUMAtriptan (IMITREX) 50 MG Tab    Dyslipidemia     Other Visit Diagnoses       Sinus headache        Thyroid disorder screen        Relevant Orders    TSH    Vitamin D deficiency        Relevant Orders    VITAMIN D,25 HYDROXY (DEFICIENCY)    Screening for cardiovascular condition        Relevant Orders    Comp Metabolic Panel    Lipid Profile    Screening for deficiency anemia        Relevant Orders    CBC WITHOUT DIFFERENTIAL    Need for hepatitis C screening test        Relevant Orders    HEP C VIRUS ANTIBODY    Encounter to establish care        Family history of diabetes mellitus (DM)        Relevant Orders    HEMOGLOBIN A1C    Encounter for screening mammogram for breast cancer        Relevant Orders    MA-SCREENING MAMMO BILAT W/TOMOSYNTHESIS W/CAD    Screening for colorectal cancer        Relevant Orders    COLOGUARD (FIT DNA)          Patient was educated in proper administration of medication(s) ordered today including safety, possible SE, risks, benefits, rationale and alternatives to therapy.   Supportive care, differential diagnoses, and indications for immediate follow-up discussed with patient.    Pathogenesis of diagnosis discussed including typical length and natural progression.    Instructed to return to clinic or nearest emergency department for any change in condition, further concerns, or worsening of symptoms.  Patient states understanding of the plan of care and discharge instructions.    Return in about 4 weeks (around 10/16/2023) for Labs, wellness.    Please note that this dictation was created using voice recognition software. I have made every reasonable attempt to correct  obvious errors, but I expect that there are errors of grammar and possibly content that I did not discover before finalizing the note.

## 2023-09-18 NOTE — ASSESSMENT & PLAN NOTE
Chronic, stable. Reports home blood pressure readings 130s/70-90s.   134/86 in clinic today. No vision changes, dizziness, chest pain, swelling LE. Does report intermittent cluster headaches which generally occur at night.  Continue valsartan 160 mg daily.  Consider adding ccb or thiazide diuretic  Goal <130/80

## 2023-09-20 ENCOUNTER — HOSPITAL ENCOUNTER (OUTPATIENT)
Dept: RADIOLOGY | Facility: MEDICAL CENTER | Age: 53
End: 2023-09-20
Payer: COMMERCIAL

## 2023-12-06 ENCOUNTER — OFFICE VISIT (OUTPATIENT)
Dept: MEDICAL GROUP | Facility: MEDICAL CENTER | Age: 53
End: 2023-12-06
Payer: COMMERCIAL

## 2023-12-06 VITALS
HEIGHT: 63 IN | DIASTOLIC BLOOD PRESSURE: 108 MMHG | HEART RATE: 95 BPM | SYSTOLIC BLOOD PRESSURE: 154 MMHG | TEMPERATURE: 98 F | WEIGHT: 128.09 LBS | OXYGEN SATURATION: 98 % | BODY MASS INDEX: 22.7 KG/M2

## 2023-12-06 DIAGNOSIS — F41.9 ANXIETY: ICD-10-CM

## 2023-12-06 DIAGNOSIS — I10 PRIMARY HYPERTENSION: ICD-10-CM

## 2023-12-06 PROCEDURE — 3080F DIAST BP >= 90 MM HG: CPT | Performed by: STUDENT IN AN ORGANIZED HEALTH CARE EDUCATION/TRAINING PROGRAM

## 2023-12-06 PROCEDURE — 99214 OFFICE O/P EST MOD 30 MIN: CPT | Performed by: STUDENT IN AN ORGANIZED HEALTH CARE EDUCATION/TRAINING PROGRAM

## 2023-12-06 PROCEDURE — 3077F SYST BP >= 140 MM HG: CPT | Performed by: STUDENT IN AN ORGANIZED HEALTH CARE EDUCATION/TRAINING PROGRAM

## 2023-12-06 RX ORDER — HYDROCHLOROTHIAZIDE 12.5 MG/1
12.5 CAPSULE, GELATIN COATED ORAL DAILY
Qty: 30 CAPSULE | Refills: 0 | Status: SHIPPED | OUTPATIENT
Start: 2023-12-06 | End: 2023-12-15 | Stop reason: SDUPTHER

## 2023-12-06 RX ORDER — HYDROXYZINE 50 MG/1
50 TABLET, FILM COATED ORAL 3 TIMES DAILY PRN
Qty: 30 TABLET | Refills: 0 | Status: SHIPPED | OUTPATIENT
Start: 2023-12-06 | End: 2023-12-15 | Stop reason: SDUPTHER

## 2023-12-06 NOTE — PROGRESS NOTES
"Subjective:     Chief Complaint   Patient presents with    Panic Attack     Constant shaking, pt reports high BP (153/134) at home         HPI:   Jil presents today with     Anxiety  Patient presents today for concern about anxiety.  Patient notes that work has been awful, she is overworked and not appreciated.  Patient is tearful discussing her work.  Patient notes that symptoms are worse in the morning when she has to go into work she becomes nauseous, high blood pressure, shaky, irritable and tearful.  Patient notes that she has worked this job for over 22 years but needs to leave  Due to recent changes and issues.  Patient denies any history of chronic anxiety or depression.  Patient notes that she is not usually tearful or depressed.    Discussed trial of hydroxyzine to use as needed to get some sleep.  Will provide patient work note for days off, Wednesday through Sunday, to relax/recovery.  Patient advised to follow-up with PCP for FMLA paperwork.  Discussed as needed medication of hydroxyzine versus long-term medication like SSRI.  Will start with hydroxyzine and continue to follow.      Hypertension  Patient presents today for concern of hypertension.  Blood pressure in office 154/108.  Patient has been following with PCP for hypertension.  Patient taking valsartan 160 mg daily and verapamil 100 mg daily.  Patient's blood pressure elevated 154/108 and 154/100 on recheck.  Discussed monitoring and relaxing versus adding medication.  Patient will continue to monitor x 2 days, if no improvement will recommend she start on hydrochlorothiazide 12.5 mg and follow-up with PCP.        ROS:  Gen: no fevers/chills  Pulm: no sob, no cough  CV: no chest pain, no palpitations  GI: no nausea/vomiting, no diarrhea      Objective:     Exam:  BP (!) 154/108 (BP Location: Right arm, Patient Position: Sitting, BP Cuff Size: Adult)   Pulse 95   Temp 36.7 °C (98 °F) (Temporal)   Ht 1.6 m (5' 3\")   Wt 58.1 kg (128 lb 1.4 " oz)   SpO2 98%   BMI 22.69 kg/m²  Body mass index is 22.69 kg/m².    Gen: Alert and oriented, No apparent distress.  Neck: Neck is supple without lymphadenopathy.  Lungs: Normal effort, CTA bilaterally, no wheezes, rhonchi, or rales  CV: Regular rate and rhythm. No murmurs, rubs, or gallops.  Ext: No clubbing, cyanosis, edema.        Assessment & Plan:     53 y.o. female with the following -     1. Primary hypertension  - hydrochlorothiazide (MICROZIDE) 12.5 MG capsule; Take 1 Capsule by mouth every day.  Dispense: 30 Capsule; Refill: 0    2. Anxiety  - hydrOXYzine HCl (ATARAX) 50 MG Tab; Take 1 Tablet by mouth 3 times a day as needed for Anxiety.  Dispense: 30 Tablet; Refill: 0       No follow-ups on file.    Please note that this dictation was created using voice recognition software. I have made every reasonable attempt to correct obvious errors, but I expect that there are errors of grammar and possibly content that I did not discover before finalizing the note.

## 2023-12-06 NOTE — LETTER
December 6, 2023    To Whom It May Concern:         This is confirmation that Jil Martinez attended her scheduled appointment with Genna Trejo P.A.-C. on 12/06/23.  Please excuse patient from work 12/6/2023 through 12/10/2023 due to recent illness.         If you have any questions please do not hesitate to call me at the phone number listed below.    Sincerely,          Genna Trejo P.A.-C.  [phone]

## 2023-12-15 ENCOUNTER — OFFICE VISIT (OUTPATIENT)
Dept: MEDICAL GROUP | Facility: PHYSICIAN GROUP | Age: 53
End: 2023-12-15
Payer: COMMERCIAL

## 2023-12-15 VITALS
HEART RATE: 77 BPM | HEIGHT: 63 IN | DIASTOLIC BLOOD PRESSURE: 88 MMHG | BODY MASS INDEX: 22.68 KG/M2 | WEIGHT: 128 LBS | OXYGEN SATURATION: 97 % | TEMPERATURE: 97.8 F | SYSTOLIC BLOOD PRESSURE: 124 MMHG

## 2023-12-15 DIAGNOSIS — F51.01 PRIMARY INSOMNIA: ICD-10-CM

## 2023-12-15 DIAGNOSIS — F41.9 ANXIETY: ICD-10-CM

## 2023-12-15 DIAGNOSIS — I10 PRIMARY HYPERTENSION: ICD-10-CM

## 2023-12-15 DIAGNOSIS — R06.83 SNORING: ICD-10-CM

## 2023-12-15 DIAGNOSIS — G47.19 EXCESSIVE DAYTIME SLEEPINESS: ICD-10-CM

## 2023-12-15 PROCEDURE — 99214 OFFICE O/P EST MOD 30 MIN: CPT

## 2023-12-15 PROCEDURE — 3074F SYST BP LT 130 MM HG: CPT

## 2023-12-15 PROCEDURE — 3079F DIAST BP 80-89 MM HG: CPT

## 2023-12-15 RX ORDER — ZOLPIDEM TARTRATE 5 MG/1
5 TABLET ORAL NIGHTLY PRN
Qty: 10 TABLET | Refills: 0 | Status: SHIPPED | OUTPATIENT
Start: 2023-12-15 | End: 2024-01-10 | Stop reason: SDUPTHER

## 2023-12-15 RX ORDER — BUSPIRONE HYDROCHLORIDE 5 MG/1
5 TABLET ORAL 3 TIMES DAILY
Qty: 90 TABLET | Refills: 1 | Status: SHIPPED | OUTPATIENT
Start: 2023-12-15 | End: 2024-01-28 | Stop reason: SDUPTHER

## 2023-12-15 RX ORDER — HYDROCHLOROTHIAZIDE 12.5 MG/1
12.5 CAPSULE, GELATIN COATED ORAL DAILY
Qty: 90 CAPSULE | Refills: 3 | Status: SHIPPED | OUTPATIENT
Start: 2023-12-15

## 2023-12-15 RX ORDER — HYDROXYZINE 50 MG/1
50 TABLET, FILM COATED ORAL 3 TIMES DAILY PRN
Qty: 30 TABLET | Refills: 3 | Status: SHIPPED | OUTPATIENT
Start: 2023-12-15 | End: 2024-01-21 | Stop reason: SDUPTHER

## 2023-12-15 ASSESSMENT — ANXIETY QUESTIONNAIRES
6. BECOMING EASILY ANNOYED OR IRRITABLE: SEVERAL DAYS
4. TROUBLE RELAXING: SEVERAL DAYS
1. FEELING NERVOUS, ANXIOUS, OR ON EDGE: SEVERAL DAYS
7. FEELING AFRAID AS IF SOMETHING AWFUL MIGHT HAPPEN: MORE THAN HALF THE DAYS
5. BEING SO RESTLESS THAT IT IS HARD TO SIT STILL: MORE THAN HALF THE DAYS
3. WORRYING TOO MUCH ABOUT DIFFERENT THINGS: MORE THAN HALF THE DAYS
GAD7 TOTAL SCORE: 11
IF YOU CHECKED OFF ANY PROBLEMS ON THIS QUESTIONNAIRE, HOW DIFFICULT HAVE THESE PROBLEMS MADE IT FOR YOU TO DO YOUR WORK, TAKE CARE OF THINGS AT HOME, OR GET ALONG WITH OTHER PEOPLE: SOMEWHAT DIFFICULT
2. NOT BEING ABLE TO STOP OR CONTROL WORRYING: MORE THAN HALF THE DAYS

## 2023-12-15 ASSESSMENT — ENCOUNTER SYMPTOMS
INSOMNIA: 1
NERVOUS/ANXIOUS: 1

## 2023-12-15 NOTE — PATIENT INSTRUCTIONS
SLEEP STUDY INSTRUCTIONS    1. Our main concern is to provide the best test and evaluation of your sleep and your cooperation in following the guidelines is very necessary.    2. We have no facilities for family members or guests at the sleep center. Special arrangements will be made for children requiring overnight sleep studies.    3. Unless otherwise instructed, AVOID alcoholic beverages on the day of your sleep study.    4. DO NOT drink coffee or caffeine-containing beverages after 12:00 noon on the day of your sleep study.    5. There is NO smoking at the sleep center.    6. Try to maintain a usual daytime schedule prior to the study (avoid unusual physical activity or meals).    7. DO NOT take a nap on the day of your study.    8. This is an outpatient procedure (test); therefore, nursing services, medications, and meals ARE NOT provided. If you take medications, bring them with you and take them on the schedule you do at home.    9. Please fill your sleep aid prescription (Ambien or Lunesta) and bring to your sleep study. Even patients who normally have no problem going to sleep often need a sleep aid in this different environment.    10. We ask that you wear conventional sleep attire (pajamas or sweats) for the sleep study. We discourage patients from wearing only their underwear to bed. We recommend two-piece pajamas as the techs will need to apply sensors to your stomach.    11. Please shampoo your hair the day of the sleep study. Please DO NOT use any other hair or skin products before your arrival (e.g., mousse, gel, hair or body spray, perfume, body lotion etc.) NOTE: Women should not wear heavy makeup prior to arrival as some wires are taped to the face area.    12. The technician will be applying several small electrodes to the scalp, eye area, chin, chest, and legs, plus respiratory effort belts around the chest. Also, there will be a device placed directly under the nose. (THIS WILL NOT OBSTRUCT  YOUR BREATHING.) This is a painless procedure and the skin is not broken.    13. The test is generally completed in six to eight hours; We are usually done between 6 - 7 a.m., unless you are scheduled for a nap study. You may need to come back another night for a second study to complete your treatment plan.    14. Patients who are scheduled for an MSLT (nap study) will stay at the sleep center for the day following their nighttime study. You will be notified if a nap study was ordered for you at the time the night study is scheduled. Generally, patients having a nap study will leave the sleep center by 4 p.m.    15. You will need to bring food for the following day if you are scheduled for a nap study. A refrigerator and microwave are available.    16. A bathroom is available for your use.    17. We are able to adjust the room temperature for your comfort. Please let the technologist know if you are uncomfortable during the study.    18. If you sleep better with a special pillow or stuffed animal, you may bring it along. Service animals are the only live animals permitted.    19. Cable T.V. is available.    20. You will be scheduled for a follow-up appointment three to five days after the sleep study to review your results.    21. A copy of your sleep study is sent to the referring physician approximately two weeks after your study.    22. Any questions can be directed to our staff at [phone].    23. If CPAP therapy is applied, a home unit will be ordered for you through the [employer] medical equipment company. You will be contacted to schedule delivery after insurance authorization.

## 2023-12-15 NOTE — ASSESSMENT & PLAN NOTE
Chronic, unstable. Has taken a variety of otc sleep aids, trazodone for this in the past without improved symptoms. Will provide trial of ambien, discussed using intermittently and tolerance/dependence precaution with this medication. Referral placed for sleep study.

## 2023-12-15 NOTE — PROGRESS NOTES
"Subjective:     CC: Diagnoses of Primary hypertension, Anxiety, Primary insomnia, Excessive daytime sleepiness, and Snoring were pertinent to this visit.    Pt presents to follow up for hypertension and anxiety.  She was seen in urgent care 12/6/2023 for this.  Reports symptoms have improved since starting hydroxyzine and hydrochlorothiazide.    HPI:   Jil presents today with    Problem   Anxiety   Primary Insomnia   Hypertension     She was on telmisartan, previous records mention nausea and vomiting, she was on lisinopril with possibility of cough as a side effect         ROS:  Review of Systems   Psychiatric/Behavioral:  The patient is nervous/anxious and has insomnia.    All other systems reviewed and are negative.      Objective:     Exam:  /88 (BP Location: Right arm, Patient Position: Sitting, BP Cuff Size: Small adult)   Pulse 77   Temp 36.6 °C (97.8 °F) (Temporal)   Ht 1.6 m (5' 3\")   Wt 58.1 kg (128 lb)   SpO2 97%   BMI 22.67 kg/m²  Body mass index is 22.67 kg/m².    Physical Exam  Vitals reviewed.   Constitutional:       General: She is not in acute distress.     Appearance: Normal appearance. She is not ill-appearing.   HENT:      Head: Normocephalic and atraumatic.   Cardiovascular:      Rate and Rhythm: Normal rate.      Pulses: Normal pulses.   Pulmonary:      Effort: Pulmonary effort is normal. No respiratory distress.   Skin:     General: Skin is warm and dry.      Findings: No rash.   Neurological:      General: No focal deficit present.      Mental Status: She is alert and oriented to person, place, and time.   Psychiatric:         Mood and Affect: Mood normal.         Behavior: Behavior normal.       Assessment & Plan:     53 y.o. female with the following -     Problem List Items Addressed This Visit       Hypertension     Chronic, improved. Bp in clinic 124/88. Continue valsartan 160 mg daily; hctz 12.5 mg          Relevant Medications    hydrochlorothiazide (MICROZIDE) 12.5 MG " capsule    Primary insomnia     Chronic, unstable. Has taken a variety of otc sleep aids, trazodone for this in the past without improved symptoms. Will provide trial of ambien, discussed using intermittently and tolerance/dependence precaution with this medication. Referral placed for sleep study.         Relevant Medications    zolpidem (AMBIEN) 5 MG Tab    Anxiety     Chronic, unstable. Reports work related anxiety. Has noticed improvements since starting on hydroxyzine, but reports dryness to nose. ROHAN: 11/moderate today    Will try buspar 5 mg TID, ok to continue hydroxyzine as needed         Relevant Medications    hydrOXYzine HCl (ATARAX) 50 MG Tab    busPIRone (BUSPAR) 5 MG tablet     Other Visit Diagnoses       Excessive daytime sleepiness        Relevant Orders    Polysomnography, 4 or More    Referral to Pulmonary and Sleep Medicine    Snoring        Relevant Orders    Polysomnography, 4 or More    Referral to Pulmonary and Sleep Medicine          Patient was educated in proper administration of medication(s) ordered today including safety, possible SE, risks, benefits, rationale and alternatives to therapy.   Supportive care, differential diagnoses, and indications for immediate follow-up discussed with patient.    Pathogenesis of diagnosis discussed including typical length and natural progression.    Instructed to return to clinic or nearest emergency department for any change in condition, further concerns, or worsening of symptoms.  Patient states understanding of the plan of care and discharge instructions.    Return in about 4 weeks (around 1/12/2024) for Anxiety, Blood Pressure.    Please note that this dictation was created using voice recognition software. I have made every reasonable attempt to correct obvious errors, but I expect that there are errors of grammar and possibly content that I did not discover before finalizing the note.

## 2023-12-15 NOTE — LETTER
December 15, 2023    To Whom It May Concern:         This is confirmation that Jil Martinez attended her scheduled appointment with ANANDA Pineda on 12/15/23.         If you have any questions please do not hesitate to call me at the phone number listed below.    Sincerely,          DALIA Pineda.  [phone]

## 2023-12-15 NOTE — ASSESSMENT & PLAN NOTE
Chronic, unstable. Reports work related anxiety. Has noticed improvements since starting on hydroxyzine, but reports dryness to nose. ROHAN: 11/moderate today    Will try buspar 5 mg TID, ok to continue hydroxyzine as needed

## 2023-12-22 DIAGNOSIS — G47.19 EXCESSIVE DAYTIME SLEEPINESS: ICD-10-CM

## 2023-12-22 DIAGNOSIS — R06.83 SNORING: ICD-10-CM

## 2024-01-09 ENCOUNTER — TELEPHONE (OUTPATIENT)
Dept: HEALTH INFORMATION MANAGEMENT | Facility: OTHER | Age: 54
End: 2024-01-09
Payer: COMMERCIAL

## 2024-01-09 ENCOUNTER — HOME STUDY (OUTPATIENT)
Dept: SLEEP MEDICINE | Facility: MEDICAL CENTER | Age: 54
End: 2024-01-09
Payer: COMMERCIAL

## 2024-01-09 DIAGNOSIS — G47.19 EXCESSIVE DAYTIME SLEEPINESS: ICD-10-CM

## 2024-01-09 DIAGNOSIS — I10 PRIMARY HYPERTENSION: ICD-10-CM

## 2024-01-09 DIAGNOSIS — R06.83 SNORING: ICD-10-CM

## 2024-01-09 PROCEDURE — 95800 SLP STDY UNATTENDED: CPT | Performed by: STUDENT IN AN ORGANIZED HEALTH CARE EDUCATION/TRAINING PROGRAM

## 2024-01-10 DIAGNOSIS — F51.01 PRIMARY INSOMNIA: ICD-10-CM

## 2024-01-10 RX ORDER — VALSARTAN 160 MG/1
160 TABLET ORAL DAILY
Qty: 90 TABLET | Refills: 3 | Status: SHIPPED | OUTPATIENT
Start: 2024-01-10 | End: 2024-02-16 | Stop reason: SDUPTHER

## 2024-01-11 NOTE — PROCEDURES
DIAGNOSTIC HOME SLEEP TEST (HST) REPORT WatchPAT      PATIENT ID:  NAME:  Jil Martinez  MRN:               [id number]  YOB: 1970  DATE OF STUDY: 01/09/2024      Impression:     This study shows evidence of:      1. Mild obstructive sleep apnea with PAT apnea hypopnea index(pAHI) of 14.4 per hour.  PAT respiratory disturbance index (pRDI) was 14.8 per hour. These findings are based on 7 channels recording of PAT signal with sleep staging, heart rate, pulse oximetry, actigraphy, body position, snoring and respiratory movement.     2. Oxygenation O2 Sat. mean O2 sat was 90%,  nathaniel was 77%,  and maximum O2 at 99 %. O2 sat was at or  below 88% for 113.9 min of evaluation time. Oxygen Desaturation (>=4%) Index was 9.1/hr. AVG HR was 60 BPM.      TECHNICAL DESCRIPTION: Patient underwent home sleep apnea testing with peripheral arterial tone signal (WatchPAT™). This is a Type IV portable monitor and device per Medicare. Monitoring was done with 7 channels recording of PAT signal with sleep staging, heart rate, pulse oximetry, actigraphy, body position, snoring and respiratory movement. Prior to using the device, the patient received verbal and written instructions for its application and was provided with the help desk phone number for additional telephonic instruction with 24-hour availability of qualified personnel to answer questions.    Respiratory events:            General sleep summary: . Total recording time is 10 hours and 58 minutes and total Sleep time is 10 hours and 11 minutes. The patient spent 242 minutes in the supine position and 369 minutes in the nonsupine position.      Recommendations:    1. CPAP titration study vs Auto CPAP trial.  If starting auto CPAP would recommend minimum pressure 4 cmH2O maximum pressure 12 cmH2O  2.   In general patients with sleep apnea are advised to avoid alcohol and sedatives and to not operate a motor vehicle while drowsy. In some cases  alternative treatment options may prove effective in resolving sleep apnea in these options include upper airway surgery, the use of a dental orthotic or weight loss and positional therapy. Clinical correlation is required.         Yifan Jain MD

## 2024-01-12 RX ORDER — ZOLPIDEM TARTRATE 5 MG/1
5 TABLET ORAL NIGHTLY PRN
Qty: 10 TABLET | Refills: 2 | Status: SHIPPED | OUTPATIENT
Start: 2024-01-12 | End: 2024-04-11

## 2024-01-15 DIAGNOSIS — G47.33 OSA (OBSTRUCTIVE SLEEP APNEA): ICD-10-CM

## 2024-01-15 DIAGNOSIS — G47.19 EXCESSIVE DAYTIME SLEEPINESS: ICD-10-CM

## 2024-01-15 DIAGNOSIS — R06.83 SNORING: ICD-10-CM

## 2024-01-21 DIAGNOSIS — F41.9 ANXIETY: ICD-10-CM

## 2024-01-22 RX ORDER — HYDROXYZINE 50 MG/1
50 TABLET, FILM COATED ORAL 3 TIMES DAILY PRN
Qty: 30 TABLET | Refills: 0 | Status: SHIPPED | OUTPATIENT
Start: 2024-01-22

## 2024-01-22 NOTE — TELEPHONE ENCOUNTER
Received request via: Pharmacy    Was the patient seen in the last year in this department? Yes    Does the patient have an active prescription (recently filled or refills available) for medication(s) requested? No    Pharmacy Name:  [id number] [address] [place] [place] [address]     Does the patient have custodial Plus and need 100 day supply (blood pressure, diabetes and cholesterol meds only)? Patient does not have SCP

## 2024-01-28 DIAGNOSIS — F41.9 ANXIETY: ICD-10-CM

## 2024-01-29 NOTE — TELEPHONE ENCOUNTER
Received request via: Pharmacy    Was the patient seen in the last year in this department? Yes    Does the patient have an active prescription (recently filled or refills available) for medication(s) requested? No    Pharmacy Name: [facility], [place]     Does the patient have care home Plus and need 100 day supply (blood pressure, diabetes and cholesterol meds only)? Patient does not have SCP

## 2024-01-31 RX ORDER — BUSPIRONE HYDROCHLORIDE 5 MG/1
5 TABLET ORAL 3 TIMES DAILY
Qty: 90 TABLET | Refills: 0 | Status: SHIPPED | OUTPATIENT
Start: 2024-01-31

## 2024-02-16 DIAGNOSIS — I10 PRIMARY HYPERTENSION: ICD-10-CM

## 2024-02-22 NOTE — TELEPHONE ENCOUNTER
Received request via: Pharmacy    Was the patient seen in the last year in this department? Yes    Does the patient have an active prescription (recently filled or refills available) for medication(s) requested? No    Pharmacy Name:   [id number] [address] [place] [address] [address] [facility]     Does the patient have assisted Plus and need 100 day supply (blood pressure, diabetes and cholesterol meds only)? Patient does not have SCP

## 2024-02-23 RX ORDER — VALSARTAN 160 MG/1
160 TABLET ORAL DAILY
Qty: 90 TABLET | Refills: 3 | Status: SHIPPED | OUTPATIENT
Start: 2024-02-23

## 2024-05-09 ENCOUNTER — PATIENT MESSAGE (OUTPATIENT)
Dept: MEDICAL GROUP | Facility: PHYSICIAN GROUP | Age: 54
End: 2024-05-09

## 2024-05-09 DIAGNOSIS — F41.9 ANXIETY: ICD-10-CM

## 2024-05-09 DIAGNOSIS — G44.011 INTRACTABLE EPISODIC CLUSTER HEADACHE: ICD-10-CM

## 2024-05-09 DIAGNOSIS — I10 PRIMARY HYPERTENSION: ICD-10-CM

## 2024-05-09 NOTE — PATIENT COMMUNICATION
Received request via: Patient    Was the patient seen in the last year in this department? Yes    Does the patient have an active prescription (recently filled or refills available) for medication(s) requested? No    Pharmacy Name: Pharmacy:   [id number] - [facility] [place] [address] [facility]     Does the patient have care home Plus and need 100 day supply (blood pressure, diabetes and cholesterol meds only)? Patient does not have SCP

## 2024-05-11 RX ORDER — HYDROXYZINE 50 MG/1
50 TABLET, FILM COATED ORAL 3 TIMES DAILY PRN
Qty: 30 TABLET | Refills: 0 | Status: SHIPPED | OUTPATIENT
Start: 2024-05-11 | End: 2024-05-20 | Stop reason: SDUPTHER

## 2024-05-11 RX ORDER — HYDROCHLOROTHIAZIDE 12.5 MG/1
12.5 CAPSULE, GELATIN COATED ORAL DAILY
Qty: 90 CAPSULE | Refills: 0 | Status: SHIPPED | OUTPATIENT
Start: 2024-05-11 | End: 2024-05-20 | Stop reason: SDUPTHER

## 2024-05-11 RX ORDER — VALSARTAN 160 MG/1
160 TABLET ORAL DAILY
Qty: 90 TABLET | Refills: 0 | Status: SHIPPED | OUTPATIENT
Start: 2024-05-11 | End: 2024-05-20 | Stop reason: SDUPTHER

## 2024-05-11 RX ORDER — SUMATRIPTAN 50 MG/1
50 TABLET, FILM COATED ORAL
Qty: 10 TABLET | Refills: 0 | Status: SHIPPED | OUTPATIENT
Start: 2024-05-11 | End: 2024-05-20 | Stop reason: SDUPTHER

## 2024-05-11 RX ORDER — BUSPIRONE HYDROCHLORIDE 5 MG/1
5 TABLET ORAL 3 TIMES DAILY
Qty: 90 TABLET | Refills: 0 | Status: SHIPPED | OUTPATIENT
Start: 2024-05-11 | End: 2024-05-20 | Stop reason: SDUPTHER

## 2024-05-20 DIAGNOSIS — F41.9 ANXIETY: ICD-10-CM

## 2024-05-20 DIAGNOSIS — I10 PRIMARY HYPERTENSION: ICD-10-CM

## 2024-05-20 DIAGNOSIS — G44.011 INTRACTABLE EPISODIC CLUSTER HEADACHE: ICD-10-CM

## 2024-05-20 RX ORDER — SUMATRIPTAN 50 MG/1
50 TABLET, FILM COATED ORAL
Qty: 10 TABLET | Refills: 0 | Status: SHIPPED | OUTPATIENT
Start: 2024-05-20

## 2024-05-20 RX ORDER — HYDROCHLOROTHIAZIDE 12.5 MG/1
12.5 CAPSULE, GELATIN COATED ORAL DAILY
Qty: 90 CAPSULE | Refills: 0 | Status: SHIPPED | OUTPATIENT
Start: 2024-05-20

## 2024-05-20 RX ORDER — VALSARTAN 160 MG/1
160 TABLET ORAL DAILY
Qty: 90 TABLET | Refills: 0 | Status: SHIPPED | OUTPATIENT
Start: 2024-05-20

## 2024-05-20 RX ORDER — BUSPIRONE HYDROCHLORIDE 5 MG/1
5 TABLET ORAL 3 TIMES DAILY
Qty: 90 TABLET | Refills: 0 | Status: SHIPPED | OUTPATIENT
Start: 2024-05-20

## 2024-05-20 RX ORDER — HYDROXYZINE 50 MG/1
50 TABLET, FILM COATED ORAL 3 TIMES DAILY PRN
Qty: 30 TABLET | Refills: 0 | Status: SHIPPED | OUTPATIENT
Start: 2024-05-20

## 2024-05-20 RX ORDER — VERAPAMIL HYDROCHLORIDE 100 MG/1
1 CAPSULE, EXTENDED RELEASE ORAL NIGHTLY
Qty: 90 CAPSULE | Refills: 3 | Status: SHIPPED | OUTPATIENT
Start: 2024-05-20 | End: 2024-05-22 | Stop reason: DRUGHIGH

## 2024-05-22 ENCOUNTER — OFFICE VISIT (OUTPATIENT)
Dept: MEDICAL GROUP | Facility: PHYSICIAN GROUP | Age: 54
End: 2024-05-22

## 2024-05-22 VITALS
BODY MASS INDEX: 23.32 KG/M2 | DIASTOLIC BLOOD PRESSURE: 88 MMHG | WEIGHT: 131.6 LBS | OXYGEN SATURATION: 96 % | HEART RATE: 86 BPM | RESPIRATION RATE: 18 BRPM | SYSTOLIC BLOOD PRESSURE: 120 MMHG | HEIGHT: 63 IN | TEMPERATURE: 97.2 F

## 2024-05-22 DIAGNOSIS — I10 PRIMARY HYPERTENSION: ICD-10-CM

## 2024-05-22 DIAGNOSIS — F41.9 ANXIETY: ICD-10-CM

## 2024-05-22 DIAGNOSIS — F51.01 PRIMARY INSOMNIA: ICD-10-CM

## 2024-05-22 DIAGNOSIS — G44.011 INTRACTABLE EPISODIC CLUSTER HEADACHE: ICD-10-CM

## 2024-05-22 PROCEDURE — 3074F SYST BP LT 130 MM HG: CPT

## 2024-05-22 PROCEDURE — 3079F DIAST BP 80-89 MM HG: CPT

## 2024-05-22 PROCEDURE — 99214 OFFICE O/P EST MOD 30 MIN: CPT

## 2024-05-22 RX ORDER — VERAPAMIL HYDROCHLORIDE 120 MG/1
120 CAPSULE, EXTENDED RELEASE ORAL DAILY
Qty: 30 CAPSULE | Refills: 3 | Status: SHIPPED | OUTPATIENT
Start: 2024-05-22

## 2024-05-22 RX ORDER — ZOLPIDEM TARTRATE 5 MG/1
5 TABLET ORAL NIGHTLY PRN
Qty: 15 TABLET | Refills: 2 | Status: SHIPPED | OUTPATIENT
Start: 2024-05-22 | End: 2024-08-20

## 2024-05-22 ASSESSMENT — ENCOUNTER SYMPTOMS
INSOMNIA: 1
NERVOUS/ANXIOUS: 1

## 2024-05-22 ASSESSMENT — PATIENT HEALTH QUESTIONNAIRE - PHQ9: CLINICAL INTERPRETATION OF PHQ2 SCORE: 0

## 2024-05-22 NOTE — ASSESSMENT & PLAN NOTE
Chronic, ongoing. Continue Ambien 5 mg as needed, has found this helps with insomnia; taking intermittently.

## 2024-05-22 NOTE — ASSESSMENT & PLAN NOTE
Chronic, stable. Increased life stressors; feels symptoms are stable with buspar 5 mg TID, hydroxyzine as needed

## 2024-05-22 NOTE — PROGRESS NOTES
"Subjective:     CC: Diagnoses of Intractable episodic cluster headache, Primary insomnia, Primary hypertension, and Anxiety were pertinent to this visit.    Pt presents today requesting refill on medications. She does not currently have insurance, so paying cash for visit/meds.    Has had increased life stressors recently. 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    Discussed she is due for colon cancer screening and breast cancer screening- will postpone until she has insurance coverage.    HPI:   Jil presents today with    Problem   Anxiety   Cluster Headaches   Primary Insomnia   Hypertension     She was on telmisartan, previous records mention nausea and vomiting, she was on lisinopril with possibility of cough as a side effect       ROS:  Review of Systems   Psychiatric/Behavioral:  Negative for suicidal ideas. The patient is nervous/anxious and has insomnia.    All other systems reviewed and are negative.      Objective:     Exam:  /88 (BP Location: Right arm, Patient Position: Sitting, BP Cuff Size: Adult)   Pulse 86   Temp 36.2 °C (97.2 °F) (Temporal)   Resp 18   Ht 1.6 m (5' 3\")   Wt 59.7 kg (131 lb 9.6 oz)   SpO2 96%   BMI 23.31 kg/m²  Body mass index is 23.31 kg/m².    Physical Exam  Vitals reviewed.   Constitutional:       General: She is not in acute distress.     Appearance: Normal appearance. She is not ill-appearing.   HENT:      Head: Normocephalic and atraumatic.   Cardiovascular:      Rate and Rhythm: Normal rate.      Pulses: Normal pulses.   Pulmonary:      Effort: Pulmonary effort is normal. No respiratory distress.   Skin:     General: Skin is warm and dry.      Findings: No rash.   Neurological:      General: No focal deficit present.      Mental Status: She is alert and oriented to person, place, and time.   Psychiatric:         Mood and Affect: Mood normal.         Behavior: Behavior normal.       Assessment & Plan:     53 y.o. female with the following -     Problem List Items " Addressed This Visit       Hypertension     Chronic, stable. Bp in clinic today 120/88. Continue hctz 12.5 mg daily, valsartan 160 mg daily         Relevant Medications    verapamil ER (CALAN SR) 120 MG CAPSULE SR 24 HR    Primary insomnia     Chronic, ongoing. Continue Ambien 5 mg as needed, has found this helps with insomnia; taking intermittently.          Relevant Medications    zolpidem (AMBIEN) 5 MG Tab    Cluster headaches     Chronic, ongoing. Does not have insurance currently. [facility] does not have verapamil 100, will increase to 120 mg er formulation for availability and cost.  Continue sumatriptan 50 mg as needed         Relevant Medications    verapamil ER (CALAN SR) 120 MG CAPSULE SR 24 HR    Anxiety     Chronic, stable. Increased life stressors; feels symptoms are stable with buspar 5 mg TID, hydroxyzine as needed          Patient was educated in proper administration of medication(s) ordered today including safety, possible SE, risks, benefits, rationale and alternatives to therapy.   Supportive care, differential diagnoses, and indications for immediate follow-up discussed with patient.    Pathogenesis of diagnosis discussed including typical length and natural progression.    Instructed to return to clinic or nearest emergency department for any change in condition, further concerns, or worsening of symptoms.  Patient states understanding of the plan of care and discharge instructions.    Return in about 3 months (around 8/22/2024) for Controlled Substance- ambien, Labs.    Please note that this dictation was created using voice recognition software. I have made every reasonable attempt to correct obvious errors, but I expect that there are errors of grammar and possibly content that I did not discover before finalizing the note.

## 2024-05-22 NOTE — ASSESSMENT & PLAN NOTE
Chronic, ongoing. Does not have insurance currently. [facility] does not have verapamil 100, will increase to 120 mg er formulation for availability and cost.  Continue sumatriptan 50 mg as needed

## 2024-07-07 DIAGNOSIS — F41.9 ANXIETY: ICD-10-CM

## 2024-07-11 RX ORDER — BUSPIRONE HYDROCHLORIDE 5 MG/1
5 TABLET ORAL 3 TIMES DAILY
Qty: 270 TABLET | Refills: 3 | Status: SHIPPED | OUTPATIENT
Start: 2024-07-11

## 2024-09-24 ENCOUNTER — OFFICE VISIT (OUTPATIENT)
Dept: MEDICAL GROUP | Facility: PHYSICIAN GROUP | Age: 54
End: 2024-09-24

## 2024-09-24 VITALS
HEIGHT: 63 IN | OXYGEN SATURATION: 97 % | SYSTOLIC BLOOD PRESSURE: 124 MMHG | HEART RATE: 75 BPM | BODY MASS INDEX: 21.97 KG/M2 | DIASTOLIC BLOOD PRESSURE: 82 MMHG | TEMPERATURE: 97.3 F | WEIGHT: 124 LBS

## 2024-09-24 DIAGNOSIS — I10 PRIMARY HYPERTENSION: ICD-10-CM

## 2024-09-24 DIAGNOSIS — F41.9 ANXIETY: ICD-10-CM
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
## 2024-09-24 PROCEDURE — 3074F SYST BP LT 130 MM HG: CPT

## 2024-09-24 PROCEDURE — 3079F DIAST BP 80-89 MM HG: CPT

## 2024-09-24 PROCEDURE — 99214 OFFICE O/P EST MOD 30 MIN: CPT

## 2024-09-24 NOTE — LETTER
[facility]  [address]  [place] [address]     September 24, 2024    Patient: Jil Martinez   YOB: 1970   Date of Visit: 9/24/2024       To Whom It May Concern:    Jil Martinez was seen and treated in our department on 9/24/2024.     Please excuse missed work due to animal bite of hand. Katerina may return to work 9/26/24 without restrictions as long as symptoms are continuing to improve.      Sincerely,           DALIA Pineda.

## 2024-09-24 NOTE — PROGRESS NOTES
"Subjective:     CC: Diagnoses of Cat bite, initial encounter, Anxiety, and Primary hypertension were pertinent to this visit.    HPI:   Jil presents today with    Problem   Cat Bite   Anxiety   Hypertension     She was on telmisartan, previous records mention nausea and vomiting, she was on lisinopril with possibility of cough as a side effect       ROS:  Review of Systems   Skin:         Cat bite to left hand   All other systems reviewed and are negative.      Objective:     Exam:  /82 (BP Location: Right arm, Patient Position: Sitting, BP Cuff Size: Adult)   Pulse 75   Temp 36.3 °C (97.3 °F) (Temporal)   Ht 1.6 m (5' 3\")   Wt 56.2 kg (124 lb)   SpO2 97%   BMI 21.97 kg/m²  Body mass index is 21.97 kg/m².    Physical Exam  Vitals reviewed.   Constitutional:       General: She is not in acute distress.     Appearance: Normal appearance. She is not ill-appearing.   HENT:      Head: Normocephalic and atraumatic.   Cardiovascular:      Rate and Rhythm: Normal rate.      Pulses: Normal pulses.   Pulmonary:      Effort: Pulmonary effort is normal. No respiratory distress.   Musculoskeletal:      Left hand: Swelling and tenderness present. Decreased range of motion.        Arms:    Skin:     General: Skin is warm and dry.      Findings: No rash.   Neurological:      General: No focal deficit present.      Mental Status: She is alert and oriented to person, place, and time.   Psychiatric:         Mood and Affect: Mood normal.         Behavior: Behavior normal.           Assessment & Plan:     54 y.o. female with the following -     Assessment & Plan  Cat bite, initial encounter  9/19/24 obtained cat bite from neighborhood cat to left proximal thumb/plantar aspect of hand.   She has been cleaning with chlorhexadine soap, watching wound closely.  Currently experiencing pain with flexion of thumb, swelling and tenderness to area.   Puncture wounds with local erythema, no drainage.  Tdap up to date  Will provide " 2 weeks amox/clav  Return if symptoms do not improve or worsen.    Orders:    amoxicillin-clavulanate (AUGMENTIN) 875-125 MG Tab; Take 1 Tablet by mouth 2 times a day for 14 days.    Anxiety  Chronic, stable. Feeling symptoms are stable presently, has not been using hydroxyzine or buspar.         Primary hypertension  Chronic, stable. Bp in clinic today 124/82. Continue valsartan 160 mg daily, hydrochlorothiazide 12.5 mg daily            Patient was educated in proper administration of medication(s) ordered today including safety, possible SE, risks, benefits, rationale and alternatives to therapy.   Supportive care, differential diagnoses, and indications for immediate follow-up discussed with patient.    Pathogenesis of diagnosis discussed including typical length and natural progression.    Instructed to return to clinic or nearest emergency department for any change in condition, further concerns, or worsening of symptoms.  Patient states understanding of the plan of care and discharge instructions.    No follow-ups on file.    Please note that this dictation was created using voice recognition software. I have made every reasonable attempt to correct obvious errors, but I expect that there are errors of grammar and possibly content that I did not discover before finalizing the note.

## 2024-09-24 NOTE — ASSESSMENT & PLAN NOTE
9/19/24 obtained cat bite from neighborhood cat to left proximal thumb/plantar aspect of hand.   She has been cleaning with chlorhexadine soap, watching wound closely.  Currently experiencing pain with flexion of thumb, swelling and tenderness to area.   Puncture wounds with local erythema, no drainage.  Tdap up to date  Will provide 2 weeks amox/clav  Return if symptoms do not improve or worsen.    Orders:    amoxicillin-clavulanate (AUGMENTIN) 875-125 MG Tab; Take 1 Tablet by mouth 2 times a day for 14 days.

## 2024-09-24 NOTE — ASSESSMENT & PLAN NOTE
Chronic, stable. Bp in clinic today 124/82. Continue valsartan 160 mg daily, hydrochlorothiazide 12.5 mg daily

## 2024-09-27 ENCOUNTER — TELEPHONE (OUTPATIENT)
Dept: MEDICAL GROUP | Facility: PHYSICIAN GROUP | Age: 54
End: 2024-09-27

## 2024-09-27 ENCOUNTER — PATIENT MESSAGE (OUTPATIENT)
Dept: MEDICAL GROUP | Facility: PHYSICIAN GROUP | Age: 54
End: 2024-09-27

## 2024-09-27 DIAGNOSIS — F51.01 PRIMARY INSOMNIA: ICD-10-CM

## 2024-09-27 RX ORDER — ZOLPIDEM TARTRATE 5 MG/1
5 TABLET ORAL NIGHTLY PRN
Qty: 15 TABLET | Refills: 0 | Status: SHIPPED | OUTPATIENT
Start: 2024-09-27 | End: 2024-12-26

## 2024-09-27 NOTE — TELEPHONE ENCOUNTER
Received request via: Patient    Was the patient seen in the last year in this department? Yes    Does the patient have an active prescription (recently filled or refills available) for medication(s) requested? No    Pharmacy Name: Pharmacy:   [id number] - [facility], [place] - [address] [place] [address]     Does the patient have snf Plus and need 100-day supply? (This applies to ALL medications) Patient does not have SCP

## 2024-09-27 NOTE — PATIENT COMMUNICATION
Received request via: Patient    Was the patient seen in the last year in this department? Yes    Does the patient have an active prescription (recently filled or refills available) for medication(s) requested? No    Pharmacy Name: [facility]    Does the patient have FDC Plus and need 100-day supply? (This applies to ALL medications) Patient does not have SCP

## 2024-10-02 ENCOUNTER — PATIENT MESSAGE (OUTPATIENT)
Dept: MEDICAL GROUP | Facility: PHYSICIAN GROUP | Age: 54
End: 2024-10-02

## 2024-10-02 ENCOUNTER — TELEPHONE (OUTPATIENT)
Dept: MEDICAL GROUP | Facility: PHYSICIAN GROUP | Age: 54
End: 2024-10-02

## 2024-10-02 DIAGNOSIS — G44.011 INTRACTABLE EPISODIC CLUSTER HEADACHE: ICD-10-CM

## 2024-10-02 RX ORDER — VERAPAMIL HYDROCHLORIDE 120 MG/1
120 CAPSULE, EXTENDED RELEASE ORAL DAILY
Qty: 90 CAPSULE | Refills: 3 | Status: SHIPPED | OUTPATIENT
Start: 2024-10-02 | End: 2024-10-04 | Stop reason: SDUPTHER

## 2024-10-04 RX ORDER — VERAPAMIL HYDROCHLORIDE 120 MG/1
120 CAPSULE, EXTENDED RELEASE ORAL DAILY
Qty: 90 CAPSULE | Refills: 3 | Status: SHIPPED | OUTPATIENT
Start: 2024-10-04

## 2024-12-12 ENCOUNTER — OFFICE VISIT (OUTPATIENT)
Dept: URGENT CARE | Facility: PHYSICIAN GROUP | Age: 54
End: 2024-12-12
Payer: COMMERCIAL

## 2024-12-12 ENCOUNTER — HOSPITAL ENCOUNTER (EMERGENCY)
Facility: MEDICAL CENTER | Age: 54
End: 2024-12-12
Attending: EMERGENCY MEDICINE
Payer: COMMERCIAL

## 2024-12-12 ENCOUNTER — APPOINTMENT (OUTPATIENT)
Dept: RADIOLOGY | Facility: MEDICAL CENTER | Age: 54
End: 2024-12-12
Attending: EMERGENCY MEDICINE
Payer: COMMERCIAL

## 2024-12-12 ENCOUNTER — PATIENT MESSAGE (OUTPATIENT)
Dept: MEDICAL GROUP | Facility: PHYSICIAN GROUP | Age: 54
End: 2024-12-12

## 2024-12-12 VITALS
RESPIRATION RATE: 17 BRPM | HEIGHT: 63 IN | TEMPERATURE: 97 F | WEIGHT: 128.31 LBS | BODY MASS INDEX: 22.73 KG/M2 | HEART RATE: 65 BPM | SYSTOLIC BLOOD PRESSURE: 130 MMHG | OXYGEN SATURATION: 94 % | DIASTOLIC BLOOD PRESSURE: 85 MMHG

## 2024-12-12 VITALS
BODY MASS INDEX: 22.5 KG/M2 | HEART RATE: 96 BPM | DIASTOLIC BLOOD PRESSURE: 80 MMHG | RESPIRATION RATE: 20 BRPM | TEMPERATURE: 99.5 F | SYSTOLIC BLOOD PRESSURE: 126 MMHG | HEIGHT: 63 IN | WEIGHT: 127 LBS | OXYGEN SATURATION: 99 %

## 2024-12-12 DIAGNOSIS — R42 EPISODE OF DIZZINESS: ICD-10-CM

## 2024-12-12 DIAGNOSIS — F41.9 ANXIETY: ICD-10-CM

## 2024-12-12 DIAGNOSIS — R07.89 ATYPICAL CHEST PAIN: ICD-10-CM

## 2024-12-12 DIAGNOSIS — G44.011 INTRACTABLE EPISODIC CLUSTER HEADACHE: ICD-10-CM

## 2024-12-12 DIAGNOSIS — H92.02 LEFT EAR PAIN: ICD-10-CM

## 2024-12-12 DIAGNOSIS — R53.81 MALAISE AND FATIGUE: ICD-10-CM

## 2024-12-12 DIAGNOSIS — R53.83 OTHER FATIGUE: ICD-10-CM

## 2024-12-12 DIAGNOSIS — R53.83 MALAISE AND FATIGUE: ICD-10-CM

## 2024-12-12 LAB
ALBUMIN SERPL BCP-MCNC: 4.3 G/DL (ref 3.2–4.9)
ALBUMIN/GLOB SERPL: 1.5 G/DL
ALP SERPL-CCNC: 62 U/L (ref 30–99)
ALT SERPL-CCNC: 18 U/L (ref 2–50)
ANION GAP SERPL CALC-SCNC: 13 MMOL/L (ref 7–16)
APPEARANCE UR: CLEAR
AST SERPL-CCNC: 20 U/L (ref 12–45)
BASOPHILS # BLD AUTO: 0.4 % (ref 0–1.8)
BASOPHILS # BLD: 0.03 K/UL (ref 0–0.12)
BILIRUB SERPL-MCNC: 0.2 MG/DL (ref 0.1–1.5)
BILIRUB UR STRIP-MCNC: NORMAL MG/DL
BUN SERPL-MCNC: 18 MG/DL (ref 8–22)
CALCIUM ALBUM COR SERPL-MCNC: 9.5 MG/DL (ref 8.5–10.5)
CALCIUM SERPL-MCNC: 9.7 MG/DL (ref 8.5–10.5)
CHLORIDE SERPL-SCNC: 101 MMOL/L (ref 96–112)
CO2 SERPL-SCNC: 23 MMOL/L (ref 20–33)
COLOR UR AUTO: YELLOW
CREAT SERPL-MCNC: 1.09 MG/DL (ref 0.5–1.4)
EKG IMPRESSION: NORMAL
EOSINOPHIL # BLD AUTO: 0.18 K/UL (ref 0–0.51)
EOSINOPHIL NFR BLD: 2.6 % (ref 0–6.9)
ERYTHROCYTE [DISTWIDTH] IN BLOOD BY AUTOMATED COUNT: 44.5 FL (ref 35.9–50)
FLUAV RNA SPEC QL NAA+PROBE: NEGATIVE
FLUBV RNA SPEC QL NAA+PROBE: NEGATIVE
GFR SERPLBLD CREATININE-BSD FMLA CKD-EPI: 60 ML/MIN/1.73 M 2
GLOBULIN SER CALC-MCNC: 2.8 G/DL (ref 1.9–3.5)
GLUCOSE BLD-MCNC: 82 MG/DL (ref 65–99)
GLUCOSE SERPL-MCNC: 104 MG/DL (ref 65–99)
GLUCOSE UR STRIP.AUTO-MCNC: NORMAL MG/DL
HCT VFR BLD AUTO: 40.3 % (ref 37–47)
HGB BLD-MCNC: 13.7 G/DL (ref 12–16)
IMM GRANULOCYTES # BLD AUTO: 0.02 K/UL (ref 0–0.11)
IMM GRANULOCYTES NFR BLD AUTO: 0.3 % (ref 0–0.9)
KETONES UR STRIP.AUTO-MCNC: NORMAL MG/DL
LEUKOCYTE ESTERASE UR QL STRIP.AUTO: NORMAL
LYMPHOCYTES # BLD AUTO: 2.02 K/UL (ref 1–4.8)
LYMPHOCYTES NFR BLD: 28.9 % (ref 22–41)
MCH RBC QN AUTO: 33.8 PG (ref 27–33)
MCHC RBC AUTO-ENTMCNC: 34 G/DL (ref 32.2–35.5)
MCV RBC AUTO: 99.5 FL (ref 81.4–97.8)
MONOCYTES # BLD AUTO: 0.7 K/UL (ref 0–0.85)
MONOCYTES NFR BLD AUTO: 10 % (ref 0–13.4)
NEUTROPHILS # BLD AUTO: 4.04 K/UL (ref 1.82–7.42)
NEUTROPHILS NFR BLD: 57.8 % (ref 44–72)
NITRITE UR QL STRIP.AUTO: NORMAL
NRBC # BLD AUTO: 0 K/UL
NRBC BLD-RTO: 0 /100 WBC (ref 0–0.2)
NT-PROBNP SERPL IA-MCNC: 201 PG/ML (ref 0–125)
PH UR STRIP.AUTO: 6 [PH] (ref 5–8)
PLATELET # BLD AUTO: 245 K/UL (ref 164–446)
PMV BLD AUTO: 9 FL (ref 9–12.9)
POTASSIUM SERPL-SCNC: 3.4 MMOL/L (ref 3.6–5.5)
PROT SERPL-MCNC: 7.1 G/DL (ref 6–8.2)
PROT UR QL STRIP: NORMAL MG/DL
RBC # BLD AUTO: 4.05 M/UL (ref 4.2–5.4)
RBC UR QL AUTO: NORMAL
RSV RNA SPEC QL NAA+PROBE: NEGATIVE
SARS-COV-2 RNA RESP QL NAA+PROBE: NEGATIVE
SODIUM SERPL-SCNC: 137 MMOL/L (ref 135–145)
SP GR UR STRIP.AUTO: 1.02
TROPONIN T SERPL-MCNC: <6 NG/L (ref 6–19)
UROBILINOGEN UR STRIP-MCNC: 0.2 MG/DL
WBC # BLD AUTO: 7 K/UL (ref 4.8–10.8)

## 2024-12-12 PROCEDURE — 85025 COMPLETE CBC W/AUTO DIFF WBC: CPT

## 2024-12-12 PROCEDURE — 84484 ASSAY OF TROPONIN QUANT: CPT

## 2024-12-12 PROCEDURE — 99283 EMERGENCY DEPT VISIT LOW MDM: CPT

## 2024-12-12 PROCEDURE — 82962 GLUCOSE BLOOD TEST: CPT

## 2024-12-12 PROCEDURE — 93005 ELECTROCARDIOGRAM TRACING: CPT | Mod: TC

## 2024-12-12 PROCEDURE — 81002 URINALYSIS NONAUTO W/O SCOPE: CPT

## 2024-12-12 PROCEDURE — 3074F SYST BP LT 130 MM HG: CPT

## 2024-12-12 PROCEDURE — 0241U POCT CEPHEID COV-2, FLU A/B, RSV - PCR: CPT

## 2024-12-12 PROCEDURE — 36415 COLL VENOUS BLD VENIPUNCTURE: CPT

## 2024-12-12 PROCEDURE — 83880 ASSAY OF NATRIURETIC PEPTIDE: CPT

## 2024-12-12 PROCEDURE — 99214 OFFICE O/P EST MOD 30 MIN: CPT

## 2024-12-12 PROCEDURE — 3079F DIAST BP 80-89 MM HG: CPT

## 2024-12-12 PROCEDURE — 93005 ELECTROCARDIOGRAM TRACING: CPT | Mod: TC | Performed by: EMERGENCY MEDICINE

## 2024-12-12 PROCEDURE — 71045 X-RAY EXAM CHEST 1 VIEW: CPT

## 2024-12-12 PROCEDURE — 80053 COMPREHEN METABOLIC PANEL: CPT

## 2024-12-12 RX ORDER — VERAPAMIL HYDROCHLORIDE 120 MG/1
120 CAPSULE, EXTENDED RELEASE ORAL DAILY
Qty: 90 CAPSULE | Refills: 3 | Status: SHIPPED | OUTPATIENT
Start: 2024-12-12

## 2024-12-12 ASSESSMENT — ENCOUNTER SYMPTOMS
DIARRHEA: 0
PHOTOPHOBIA: 0
SORE THROAT: 0
LOSS OF CONSCIOUSNESS: 0
FOCAL WEAKNESS: 0
CHILLS: 1
EYE REDNESS: 0
HEADACHES: 1
DIZZINESS: 1
PALPITATIONS: 0
FLANK PAIN: 0
WHEEZING: 0
SHORTNESS OF BREATH: 0
BLURRED VISION: 0
SEIZURES: 0
SPEECH CHANGE: 0
WEAKNESS: 0
EYE DISCHARGE: 0
NAUSEA: 0
NECK PAIN: 0
ABDOMINAL PAIN: 0
SPUTUM PRODUCTION: 0
FEVER: 0
DOUBLE VISION: 0
COUGH: 0
SENSORY CHANGE: 0
FALLS: 0
STRIDOR: 0
MYALGIAS: 0
EYE PAIN: 0
TINGLING: 0
VOMITING: 0

## 2024-12-12 ASSESSMENT — HEART SCORE
AGE: 45-64
HISTORY: SLIGHTLY SUSPICIOUS
HEART SCORE: 2
TROPONIN: LESS THAN OR EQUAL TO NORMAL LIMIT
ECG: NORMAL
RISK FACTORS: 1-2 RISK FACTORS

## 2024-12-12 ASSESSMENT — VISUAL ACUITY: OU: 1

## 2024-12-12 NOTE — LETTER
[facility]  [address]  [place] [address]     December 12, 2024    Patient: Jil Martinez   YOB: 1970   Date of Visit: 12/12/2024       To Whom It May Concern:    Jil Martinez was seen and treated in our department on 12/12/2024.     Please excuse Jil from work 12/12/24-12/13/24.   She may return on 12/13/24 if symptoms improve.           Sincerely,     DALIA Tan.

## 2024-12-12 NOTE — PROGRESS NOTES
Subjective     Katerina Martinez is a 54 y.o. female who presents with Otalgia (Left ear pain, fatigue )    HPI:   Katerina is a 55yo female presenting after an episode of dizziness and disorientation. Patient reports she was at work and felt a sudden rush of body aches, head pressure, dizziness, and slight disorientation. Reports associated left ear pain. The dizziness is not precipitated by head position changes. No dysuria. No bloody stools. Denies recent changes in prescribed medications. Denies head or neck trauma. No vision changes. Denies speech changes. No vomiting or syncope. Denies fevers, no numbness/tingling or sensation loss. Denies chest pain.     Review of Systems   Constitutional:  Positive for chills and malaise/fatigue. Negative for fever.   HENT:  Positive for congestion and ear pain. Negative for ear discharge and sore throat.    Eyes:  Negative for blurred vision, double vision, photophobia, pain, discharge and redness.   Respiratory:  Negative for cough, sputum production, shortness of breath, wheezing and stridor.    Cardiovascular:  Negative for chest pain, palpitations and leg swelling.   Gastrointestinal:  Negative for abdominal pain, diarrhea, nausea and vomiting.   Genitourinary:  Negative for dysuria, flank pain, frequency, hematuria and urgency.   Musculoskeletal:  Negative for falls, myalgias and neck pain.   Skin:  Negative for rash.   Neurological:  Positive for dizziness and headaches. Negative for tingling, sensory change, speech change, focal weakness, seizures, loss of consciousness and weakness.     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    Teeth problem 5/27/2021     Tension-type headache, not intractable 9/18/2023     Past Surgical History:   Procedure Laterality Date    ABDOMINAL HYSTERECTOMY TOTAL      complete    LITHOTRIPSY Left     with nephrostomy tubes placed/removed    OTHER      bilateral large toe surgeries    OTHER ORTHOPEDIC SURGERY      bilateral wrists - membranes filled with fluid    SINUSOTOMIES      URETEROSCOPY       Allergies: Fentanyl, Methylprednisolone sodium succ, Tramadol, and Trazodone     Current Outpatient Medications:     verapamil ER (CALAN SR) 120 MG CAPSULE SR 24 HR, Take 1 Capsule by mouth every day., Disp: 90 Capsule, Rfl: 3    zolpidem (AMBIEN) 5 MG Tab, Take 1 Tablet by mouth at bedtime as needed for Sleep for up to 90 days. Indications: Trouble Sleeping, Disp: 15 Tablet, Rfl: 0    busPIRone (BUSPAR) 5 MG tablet, TAKE 1 TABLET BY MOUTH THREE TIMES DAILY, Disp: 270 Tablet, Rfl: 3    valsartan (DIOVAN) 160 MG Tab, Take 1 Tablet by mouth every day., Disp: 90 Tablet, Rfl: 0    hydrochlorothiazide (MICROZIDE) 12.5 MG capsule, Take 1 Capsule by mouth every day., Disp: 90 Capsule, Rfl: 0    hydrOXYzine HCl (ATARAX) 50 MG Tab, Take 1 Tablet by mouth 3 times a day as needed for Anxiety., Disp: 30 Tablet, Rfl: 0    SUMAtriptan (IMITREX) 50 MG Tab, Take 1 Tablet by mouth one time as needed for Migraine for up to 1 dose., Disp: 10 Tablet, Rfl: 0     Social History     Tobacco Use    Smoking status: Every Day     Current packs/day: 0.00     Average packs/day: 0.5 packs/day for 20.0 years (10.0 ttl pk-yrs)     Types: Cigarettes     Start date: 4/11/2001     Last attempt to quit: 4/11/2021     Years since quitting: 3.6    Smokeless tobacco: Never    Tobacco comments:     1 pack per day   Vaping Use    Vaping status: Never Used   Substance Use Topics    Alcohol use: Not Currently     Alcohol/week: 3.6 oz     Types: 6 Cans of beer per week     Comment: rarely    Drug use: No     Family History   Problem Relation Age of Onset    Hypertension Mother     Heart  "Disease Father     Stroke Father     Hypertension Father     Cancer Maternal Uncle         brain    Diabetes Maternal Grandmother     Hypertension Maternal Grandmother     Hypertension Maternal Grandfather     Hypertension Paternal Grandmother     Hypertension Paternal Grandfather     Hyperlipidemia Neg Hx     Breast Cancer Neg Hx     Colorectal Cancer Neg Hx     Peritoneal Cancer Neg Hx     Tubal Cancer Neg Hx     Ovarian Cancer Neg Hx      Medications, Allergies, and current problem list reviewed today in Epic.      Objective     /80 (BP Location: Right arm, Patient Position: Sitting, BP Cuff Size: Adult)   Pulse 96   Temp 37.5 °C (99.5 °F) (Temporal)   Resp 20   Ht 1.6 m (5' 3\")   Wt 57.6 kg (127 lb)   SpO2 99%   BMI 22.50 kg/m²      Physical Exam  Vitals reviewed.   Constitutional:       General: She is not in acute distress.  HENT:      Head: Normocephalic and atraumatic. No right periorbital erythema or left periorbital erythema.      Jaw: There is normal jaw occlusion. No tenderness, swelling, pain on movement or malocclusion.      Right Ear: Tympanic membrane, ear canal and external ear normal.      Left Ear: Tympanic membrane, ear canal and external ear normal.      Nose: Nose normal.      Right Sinus: No maxillary sinus tenderness or frontal sinus tenderness.      Left Sinus: No maxillary sinus tenderness or frontal sinus tenderness.      Mouth/Throat:      Mouth: Mucous membranes are moist.      Pharynx: Uvula midline. No oropharyngeal exudate, posterior oropharyngeal erythema or uvula swelling.   Eyes:      General: Vision grossly intact. Gaze aligned appropriately. No visual field deficit.     Extraocular Movements: Extraocular movements intact.      Right eye: No nystagmus.      Left eye: No nystagmus.      Conjunctiva/sclera: Conjunctivae normal.      Pupils: Pupils are equal, round, and reactive to light.      Right eye: Pupil is round, reactive and not sluggish.      Left eye: Pupil is " round, reactive and not sluggish.      Funduscopic exam:     Right eye: Red reflex present.         Left eye: Red reflex present.  Neck:      Trachea: Trachea normal. No abnormal tracheal secretions or tracheal deviation.   Cardiovascular:      Rate and Rhythm: Normal rate and regular rhythm.      Pulses: Normal pulses.      Heart sounds: Normal heart sounds.   Pulmonary:      Effort: Pulmonary effort is normal. No tachypnea, accessory muscle usage, prolonged expiration, respiratory distress or retractions.      Breath sounds: Normal breath sounds. No stridor, decreased air movement or transmitted upper airway sounds. No wheezing, rhonchi or rales.   Musculoskeletal:         General: Normal range of motion.      Cervical back: Full passive range of motion without pain, normal range of motion and neck supple. No erythema, rigidity or crepitus. No pain with movement. Normal range of motion.      Right lower leg: No edema.      Left lower leg: No edema.   Skin:     General: Skin is warm and dry.      Findings: No rash.   Neurological:      General: No focal deficit present.      Mental Status: She is alert. Mental status is at baseline.      Cranial Nerves: Cranial nerves 2-12 are intact.      Sensory: Sensation is intact.      Motor: Motor function is intact.      Coordination: Coordination is intact.      Gait: Gait is intact.   Psychiatric:         Mood and Affect: Mood normal.         Behavior: Behavior normal.         Thought Content: Thought content normal.       Results for orders placed or performed in visit on 12/12/24   POCT Glucose    Collection Time: 12/12/24  2:00 PM   Result Value Ref Range    Glucose - Accu-Ck 82 65 - 99 mg/dL   POCT Urinalysis    Collection Time: 12/12/24  2:08 PM   Result Value Ref Range    POC Color yellow Negative    POC Appearance clear Negative    POC Glucose neg Negative mg/dL    POC Bilirubin neg Negative mg/dL    POC Ketones neg Negative mg/dL    POC Specific Gravity 1.020  <1.005 - >1.030    POC Blood trace Negative    POC Urine PH 6.0 5.0 - 8.0    POC Protein neg Negative mg/dL    POC Urobiligen 0.2 Negative (0.2) mg/dL    POC Nitrites neg Negative    POC Leukocyte Esterase neg Negative     EKG: Sinus bradycardia rate 59, BBB present.     Assessment & Plan     1. Left ear pain     2. Other fatigue   - EKG - Clinic Performed  - POCT Glucose  - POCT CoV-2, Flu A/B, RSV by PCR    3. Episode of dizziness   - POCT Urinalysis  - POCT Glucose       MDM/Comments:   Patient presenting with dizziness, fatigue, and pre-syncopal episode.   Unable to rule out emergent cardiac or other pathology at this time, warranting higher level of care for further evaluation.   Patient verbalizes understanding and is in agreement with the plan of care.     Differential diagnosis, natural history, supportive care, and indications for immediate follow-up discussed.        Disposition:     Higher level care via private car in stable condition                                   Electronically signed by EDWARD Armstrong

## 2024-12-12 NOTE — TELEPHONE ENCOUNTER
Patient would like the verapamil ER to be sent to the [facility] on [address] please.

## 2024-12-12 NOTE — ED TRIAGE NOTES
"Chief Complaint   Patient presents with    Fatigue     Pt reports fatigue starting today, seen at  and sent here due to \"blockage\" seen on EKG. Pt reports some intermittent chest pain for the last week and chronic SOB with no changes.     Pt ambulatory to triage for above complaint.     Pt is alert & oriented and follows commands. Pt speaking in full sentences and responds appropriately to questions. No acute distress noted in triage. Respirations are even and unlabored. Skin is pink/warm/dry.    Pt placed back in lobby and educated on triage process. Pt encouraged to alert staff to any changes in condition.  "

## 2024-12-13 RX ORDER — HYDROXYZINE HYDROCHLORIDE 50 MG/1
50 TABLET, FILM COATED ORAL 3 TIMES DAILY PRN
Qty: 30 TABLET | Refills: 3 | Status: SHIPPED | OUTPATIENT
Start: 2024-12-13

## 2024-12-13 NOTE — PATIENT COMMUNICATION
Received request via: Patient    Was the patient seen in the last year in this department? Yes    Does the patient have an active prescription (recently filled or refills available) for medication(s) requested? No    Pharmacy Name: Pharmacy:   [id number] - [facility], [place] - [address] [place] [address]      Does the patient have [facility] Plus and need 100-day supply? (This applies to ALL medications) Patient does not have SCP

## 2024-12-13 NOTE — ED PROVIDER NOTES
"ED Provider Note    CHIEF COMPLAINT  Chief Complaint   Patient presents with    Fatigue     Pt reports fatigue starting today, seen at  and sent here due to \"blockage\" seen on EKG. Pt reports some intermittent chest pain for the last week and chronic SOB with no changes.       HPI  Jil Martinez is a 54 y.o. female who presents for evaluation of fatigue and symptoms of an off-balance feeling while at work as well as feeling flushed.  She does note a vague nonradiating mild chest pain which has been intermittent and not necessarily associated with any exertion.  She states she always has some shortness of breath but this is not new or changed.  Patient notes no current chest pain, head pain, visual changes, difficulty speaking or swallowing, abdominal pain, nausea, vomiting, or diarrhea.  She has had no hematuria or dysuria.  At the behest of her office staff, she was seen at the urgent care.  EKG was performed which was apparently concerning for \"a blockage on the left side.\"  Patient also had a urinalysis which was normal and a COVID test that was negative.  EXTERNAL RECORDS REVIEWED  Reviewed urgent care visit from earlier today.  Patient was seen for left ear pain, fatigue, and an episode of dizziness.  COVID, influenza, and RSV by PCR which was negative.  Glucose was around 80.  ROS  Constitutional: No fevers or chills  Skin: No rashes  HEENT: No sore throat, or runny nose  Neck: No neck pain  Chest: No current pain.  Pulm: No cough, wheezing, stridor, or pain with inspiration/expiration  Gastrointestinal: No nausea, vomiting, diarrhea, constipation, bloating, melena, hematochezia or abdominal pain.  Genitourinary: No dysuria or hematuria  Musculoskeletal: No pain, swelling, or focal weakness  Neurologic: No sensory or focal motor changes to extremities. No confusion or disorientation  Heme: No bleeding or bruising problems.   Immuno: No hx of recurrent infections        LIMITATION TO HISTORY "   None  OUTSIDE HISTORIAN(S):  None        PAST FAM HISTORY  Family History   Problem Relation Age of Onset    Hypertension Mother     Heart Disease Father     Stroke Father     Hypertension Father     Cancer Maternal Uncle         brain    Diabetes Maternal Grandmother     Hypertension Maternal Grandmother     Hypertension Maternal Grandfather     Hypertension Paternal Grandmother     Hypertension Paternal Grandfather     Hyperlipidemia Neg Hx     Breast Cancer Neg Hx     Colorectal Cancer Neg Hx     Peritoneal Cancer Neg Hx     Tubal Cancer Neg Hx     Ovarian Cancer Neg Hx        PAST MEDICAL HISTORY   has a past medical history of Anxiety, Chronic left lower quadrant pain (5/27/2021), Chronic pansinusitis (9/15/2021), Depression, Endometriosis, Hypertension (05/27/2021), Hypertrophy of nasal turbinates (9/15/2021), Other chest pain (5/10/2022), Renal disorder, Teeth problem (5/27/2021), and Tension-type headache, not intractable (9/18/2023).    SOCIAL HISTORY  Social History     Tobacco Use    Smoking status: Former     Current packs/day: 0.00     Average packs/day: 0.5 packs/day for 20.0 years (10.0 ttl pk-yrs)     Types: Cigarettes     Start date: 4/11/2001     Quit date: 4/11/2021     Years since quitting: 3.6    Smokeless tobacco: Never    Tobacco comments:     1 pack per day   Vaping Use    Vaping status: Never Used   Substance and Sexual Activity    Alcohol use: Not Currently     Alcohol/week: 3.6 oz     Types: 6 Cans of beer per week     Comment: rarely    Drug use: No    Sexual activity: Not Currently     Partners: Male       SURGICAL HISTORY   has a past surgical history that includes other orthopedic surgery; other; abdominal hysterectomy total; ureteroscopy; lithotripsy (Left); and sinusotomies.    CURRENT MEDICATIONS  Home Medications       Reviewed by Abigail Araiza R.N. (Registered Nurse) on 12/12/24 at 1552  Med List Status: Partial     Medication Last Dose Status   busPIRone (BUSPAR) 5 MG  "tablet  Active   hydrochlorothiazide (MICROZIDE) 12.5 MG capsule  Active   hydrOXYzine HCl (ATARAX) 50 MG Tab  Active   SUMAtriptan (IMITREX) 50 MG Tab  Active   valsartan (DIOVAN) 160 MG Tab  Active   verapamil ER (CALAN SR) 120 MG CAPSULE SR 24 HR  Active   zolpidem (AMBIEN) 5 MG Tab  Active                  Audit from Redirected Encounters    **Home medications have not yet been reviewed for this encounter**          ALLERGIES  Allergies   Allergen Reactions    Fentanyl      angry    Methylprednisolone Sodium Succ     Tramadol Anxiety and Vomiting    Trazodone Vomiting       PHYSICAL EXAM  VITAL SIGNS: /85   Pulse 65   Temp 36.1 °C (97 °F) (Temporal)   Resp 17   Ht 1.6 m (5' 3\")   Wt 58.2 kg (128 lb 4.9 oz)   SpO2 94%   BMI 22.73 kg/m²    Gen: Alert in no apparent distress.  HEENT: No signs of trauma, Bilateral external ears normal, Nose normal. Conjunctiva normal, Non-icteric.    Cardiovascular: Regular rate and rhythm, no murmurs.  Capillary refill less than 3 seconds to all extremities, 2+ distal pulses.  Thorax & Lungs: Normal breath sounds, No respiratory distress, No wheezing bilateral chest rise  Abdomen: Bowel sounds normal, Soft, No tenderness, No masses, No pulsatile masses. No Guarding or rebound  Skin: Warm, Dry, No erythema, No rash noted to exposed areas.   Extremities: Intact distal pulses, No edema  Neurologic: Alert , no facial droop, grossly normal coordination and strength  Psychiatric: Affect pleasant    INITIAL IMPRESSION  Patient arrives for evaluation of multiple fairly vague symptoms including a nonradiating atypical chest pain in the setting of chronic shortness of breath which is very mild and unchanged today.  She did have an episode at work today which she felt may have been related to a near syncopal event but did not have any increase in chest pain or palpitations.  After discussion with the patient, we will perform a screening laboratory evaluation including a troponin, " and repeat troponin if necessary.  Her EKG appears to show a possible RSR prime however this is not significantly changed from previous EKG.  Based on her heart score of 2, albeit prior to the troponin returning, I feel is very likely she will not need any further inpatient ACS rule out.  Patient states understanding of this plan and will be watched closely for any deterioration in the meantime.    ED observation? No    LABS  Results for orders placed or performed during the hospital encounter of 24   EKG    Collection Time: 24  4:03 PM   Result Value Ref Range    Report       [facility] Emergency Dept.    Test Date:  2024  Pt Name:    NANCY ADRIAN                 Department: ER  MRN:        [id number]                      Room:  Gender:     Female                       Technician: [id number]  :        1970                   Requested By:ER TRIAGE PROTOCOL  Order #:    [id number]                    Reading MD:    Measurements  Intervals                                Axis  Rate:       63                           P:          50  MD:         124                          QRS:        -40  QRSD:       97                           T:          59  QT:         403  QTc:        413    Interpretive Statements  Sinus rhythm  Left axis deviation  RSR' in V1 or V2, probably normal variant  Borderline T abnormalities, anterior leads  Compared to ECG 2023 10:18:03  RSR' in V1 or V2 now present  T-wave abnormality now present     CBC with Differential    Collection Time: 24  4:14 PM   Result Value Ref Range    WBC 7.0 4.8 - 10.8 K/uL    RBC 4.05 (L) 4.20 - 5.40 M/uL    Hemoglobin 13.7 12.0 - 16.0 g/dL    Hematocrit 40.3 37.0 - 47.0 %    MCV 99.5 (H) 81.4 - 97.8 fL    MCH 33.8 (H) 27.0 - 33.0 pg    MCHC 34.0 32.2 - 35.5 g/dL    RDW 44.5 35.9 - 50.0 fL    Platelet Count 245 164 - 446 K/uL    MPV 9.0 9.0 - 12.9 fL    Neutrophils-Polys 57.80 44.00 - 72.00 %    Lymphocytes 28.90 22.00 -  41.00 %    Monocytes 10.00 0.00 - 13.40 %    Eosinophils 2.60 0.00 - 6.90 %    Basophils 0.40 0.00 - 1.80 %    Immature Granulocytes 0.30 0.00 - 0.90 %    Nucleated RBC 0.00 0.00 - 0.20 /100 WBC    Neutrophils (Absolute) 4.04 1.82 - 7.42 K/uL    Lymphs (Absolute) 2.02 1.00 - 4.80 K/uL    Monos (Absolute) 0.70 0.00 - 0.85 K/uL    Eos (Absolute) 0.18 0.00 - 0.51 K/uL    Baso (Absolute) 0.03 0.00 - 0.12 K/uL    Immature Granulocytes (abs) 0.02 0.00 - 0.11 K/uL    NRBC (Absolute) 0.00 K/uL   Complete Metabolic Panel (CMP)    Collection Time: 12/12/24  4:14 PM   Result Value Ref Range    Sodium 137 135 - 145 mmol/L    Potassium 3.4 (L) 3.6 - 5.5 mmol/L    Chloride 101 96 - 112 mmol/L    Co2 23 20 - 33 mmol/L    Anion Gap 13.0 7.0 - 16.0    Glucose 104 (H) 65 - 99 mg/dL    Bun 18 8 - 22 mg/dL    Creatinine 1.09 0.50 - 1.40 mg/dL    Calcium 9.7 8.5 - 10.5 mg/dL    Correct Calcium 9.5 8.5 - 10.5 mg/dL    AST(SGOT) 20 12 - 45 U/L    ALT(SGPT) 18 2 - 50 U/L    Alkaline Phosphatase 62 30 - 99 U/L    Total Bilirubin 0.2 0.1 - 1.5 mg/dL    Albumin 4.3 3.2 - 4.9 g/dL    Total Protein 7.1 6.0 - 8.2 g/dL    Globulin 2.8 1.9 - 3.5 g/dL    A-G Ratio 1.5 g/dL   proBrain Natriuretic Peptide, NT (BNP)    Collection Time: 12/12/24  4:14 PM   Result Value Ref Range    NT-proBNP 201 (H) 0 - 125 pg/mL   Troponins NOW    Collection Time: 12/12/24  4:14 PM   Result Value Ref Range    Troponin T <6 6 - 19 ng/L   ESTIMATED GFR    Collection Time: 12/12/24  4:14 PM   Result Value Ref Range    GFR (CKD-EPI) 60 >60 mL/min/1.73 m 2     I have independently interpreted this EKG  Sinus rhythm rate of 63, intervals appeared within normal limits.  There is an RSR prime in V1 and V2.  There are no convincing ST or T wave changes to suggest ischemia and there is no ectopy.  Compared to an EKG performed in April 2023, the do not appear to be any significant changes.  I have independently interpreted the diagnostic imaging associated with this visit and am  waiting the final reading from the radiologist.   My preliminary interpretation is a follows: Single view chest x-ray: There are no convincing pulmonary opacities to suggest lobar infiltrates or effusions.  Cardiomediastinal silhouette appears to be within normal limits.  RADIOLOGY  DX-CHEST-PORTABLE (1 VIEW)   Final Result      1.  Left base atelectasis.              ASSESSMENT, COURSE AND PLAN  Care Narrative: Reevaluated patient at bedside 5:40 PM.  Patient is calm, conversant, and has no new symptoms.  Her labs are generally reassuring and is notable that she did not have any convincing findings to suggest cardiac ischemia.  Her symptoms of chest pain were not associated with any significant other symptoms and she notes that she always feels mildly short of breath.  She recently stopped smoking a week and a half ago and wonders if this is related.  She does not appear short of breath or distressed in any way on reevaluation or on initial evaluation.  I do not feel she requires inpatient rule out or ACS evaluation as her heart score is only 2.  She is not hypoxic or tachycardic and I do not feel her symptoms are related to PE.  She does not have any findings to suggest a significant infectious process and I do not feel antibiotics are necessary.  I did discuss the apparent atelectasis seen in her chest x-ray, but again, this does not require any direct treatment.  She will watch for any worsening signs or symptoms and return if they occur.  Patient is comfortable with the plan.              I have discussed management of the patient with the following physicians and ROCK's: None    Escalation of care considered, and ultimately not performed: Considered inpatient ACS rule out but felt to be unnecessary given the low risk.    Barriers to care at this time, including but not limited to: None.     Decision tools and Rx drugs considered including, but not limited to : Heart score    Discussion of management with other  QHP or appropriate source(s): None    The patient will return for worsening symptoms and is stable at the time of discharge. The patient verbalizes understanding and will comply.    FINAL IMPRESSION  1. Malaise and fatigue    2. Atypical chest pain        Electronically signed by: Carlos Paul M.D., 12/12/2024 4:45 PM

## 2024-12-13 NOTE — TELEPHONE ENCOUNTER
Received request via: Patient    Was the patient seen in the last year in this department? Yes    Does the patient have an active prescription (recently filled or refills available) for medication(s) requested? No    Pharmacy Name: Pharmacy:     [id number] - [place], [place] - [address]     Does the patient have MCC Plus and need 100-day supply? (This applies to ALL medications) Patient does not have SCP

## 2024-12-17 RX ORDER — BUSPIRONE HYDROCHLORIDE 5 MG/1
5 TABLET ORAL 3 TIMES DAILY
Qty: 270 TABLET | Refills: 3 | Status: SHIPPED | OUTPATIENT
Start: 2024-12-17

## 2024-12-17 NOTE — TELEPHONE ENCOUNTER
Received request via: Patient    Was the patient seen in the last year in this department? Yes    Does the patient have an active prescription (recently filled or refills available) for medication(s) requested? No    Pharmacy Name: Pharmacy:   [id number] - [facility] [place] - [address] [place] [address]      Does the patient have FDC Plus and need 100-day supply? (This applies to ALL medications) Patient does not have SCP

## 2025-01-30 ENCOUNTER — PATIENT MESSAGE (OUTPATIENT)
Dept: MEDICAL GROUP | Facility: PHYSICIAN GROUP | Age: 55
End: 2025-01-30
Payer: COMMERCIAL

## 2025-01-30 DIAGNOSIS — I10 PRIMARY HYPERTENSION: ICD-10-CM

## 2025-01-30 NOTE — PATIENT COMMUNICATION
Received request via: Patient    Was the patient seen in the last year in this department? Yes    Does the patient have an active prescription (recently filled or refills available) for medication(s) requested? No    Pharmacy Name: [facility]

## 2025-01-31 RX ORDER — HYDROCHLOROTHIAZIDE 12.5 MG/1
12.5 CAPSULE ORAL DAILY
Qty: 100 CAPSULE | Refills: 3 | Status: SHIPPED | OUTPATIENT
Start: 2025-01-31

## 2025-02-15 ENCOUNTER — PATIENT MESSAGE (OUTPATIENT)
Dept: MEDICAL GROUP | Facility: PHYSICIAN GROUP | Age: 55
End: 2025-02-15
Payer: COMMERCIAL

## 2025-02-15 DIAGNOSIS — F51.01 PRIMARY INSOMNIA: ICD-10-CM

## 2025-02-18 RX ORDER — ZOLPIDEM TARTRATE 5 MG/1
TABLET ORAL
COMMUNITY
Start: 2025-02-15

## 2025-02-18 NOTE — PATIENT COMMUNICATION
Received request via: Patient    Was the patient seen in the last year in this department? Yes    Does the patient have an active prescription (recently filled or refills available) for medication(s) requested? No    Pharmacy Name:   [facility] [address] [place] [address] - [address] [place] [id number]  Phone: [phone] Fax: [phone]            Does the patient have FCI Plus and need 100-day supply? (This applies to ALL medications) Patient does not have SCP

## 2025-02-19 RX ORDER — ZOLPIDEM TARTRATE 5 MG/1
TABLET ORAL
Qty: 15 TABLET | Refills: 0 | OUTPATIENT
Start: 2025-02-19

## 2025-02-19 RX ORDER — ZOLPIDEM TARTRATE 5 MG/1
TABLET ORAL
Qty: 30 TABLET
Start: 2025-02-19

## 2025-02-19 NOTE — TELEPHONE ENCOUNTER
Received request via: Pharmacy    Was the patient seen in the last year in this department? Yes    Does the patient have an active prescription (recently filled or refills available) for medication(s) requested? No    Pharmacy Name:   [facility] [address] [place] [address] - [address] [address]  Phone: [phone] Fax: [phone]       Does the patient have alf Plus and need 100-day supply? (This applies to ALL medications) Patient does not have SCP

## 2025-04-01 DIAGNOSIS — F41.9 ANXIETY: ICD-10-CM

## 2025-04-01 NOTE — TELEPHONE ENCOUNTER
Received request via: Pharmacy    Was the patient seen in the last year in this department? Yes    Does the patient have an active prescription (recently filled or refills available) for medication(s) requested? No    Pharmacy Name:   [facility] [address] [place] [address] - [address] [address]  Phone: [phone] Fax: [phone]       Does the patient have retirement Plus and need 100-day supply? (This applies to ALL medications) Patient does not have SCP

## 2025-04-02 ENCOUNTER — OFFICE VISIT (OUTPATIENT)
Dept: MEDICAL GROUP | Facility: PHYSICIAN GROUP | Age: 55
End: 2025-04-02
Payer: COMMERCIAL

## 2025-04-02 ENCOUNTER — HOSPITAL ENCOUNTER (OUTPATIENT)
Facility: MEDICAL CENTER | Age: 55
End: 2025-04-02
Payer: COMMERCIAL

## 2025-04-02 VITALS
DIASTOLIC BLOOD PRESSURE: 62 MMHG | HEIGHT: 63 IN | WEIGHT: 127 LBS | BODY MASS INDEX: 22.5 KG/M2 | OXYGEN SATURATION: 97 % | SYSTOLIC BLOOD PRESSURE: 114 MMHG | HEART RATE: 72 BPM | RESPIRATION RATE: 20 BRPM | TEMPERATURE: 97.6 F

## 2025-04-02 DIAGNOSIS — Z12.12 SCREENING FOR COLORECTAL CANCER: ICD-10-CM

## 2025-04-02 DIAGNOSIS — Z11.59 NEED FOR HEPATITIS C SCREENING TEST: ICD-10-CM

## 2025-04-02 DIAGNOSIS — Z23 NEED FOR VACCINATION: ICD-10-CM

## 2025-04-02 DIAGNOSIS — Z13.0 SCREENING FOR DEFICIENCY ANEMIA: ICD-10-CM

## 2025-04-02 DIAGNOSIS — G44.011 INTRACTABLE EPISODIC CLUSTER HEADACHE: ICD-10-CM

## 2025-04-02 DIAGNOSIS — E55.9 VITAMIN D DEFICIENCY: ICD-10-CM

## 2025-04-02 DIAGNOSIS — Z13.6 SCREENING FOR CARDIOVASCULAR CONDITION: ICD-10-CM

## 2025-04-02 DIAGNOSIS — M79.89 SWELLING OF RIGHT LITTLE FINGER: ICD-10-CM

## 2025-04-02 DIAGNOSIS — F51.01 PRIMARY INSOMNIA: ICD-10-CM

## 2025-04-02 DIAGNOSIS — Z00.00 WELLNESS EXAMINATION: ICD-10-CM

## 2025-04-02 DIAGNOSIS — Z12.11 SCREENING FOR COLORECTAL CANCER: ICD-10-CM

## 2025-04-02 DIAGNOSIS — M65.311 TRIGGER FINGER OF RIGHT THUMB: ICD-10-CM

## 2025-04-02 DIAGNOSIS — I10 PRIMARY HYPERTENSION: ICD-10-CM

## 2025-04-02 DIAGNOSIS — Z13.29 THYROID DISORDER SCREEN: ICD-10-CM

## 2025-04-02 DIAGNOSIS — Z12.31 ENCOUNTER FOR SCREENING MAMMOGRAM FOR BREAST CANCER: ICD-10-CM

## 2025-04-02 DIAGNOSIS — G47.33 OSA (OBSTRUCTIVE SLEEP APNEA): ICD-10-CM
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
## 2025-04-02 PROCEDURE — G0480 DRUG TEST DEF 1-7 CLASSES: HCPCS

## 2025-04-02 PROCEDURE — 80307 DRUG TEST PRSMV CHEM ANLYZR: CPT

## 2025-04-02 PROCEDURE — 3074F SYST BP LT 130 MM HG: CPT

## 2025-04-02 PROCEDURE — 99214 OFFICE O/P EST MOD 30 MIN: CPT | Mod: 25

## 2025-04-02 PROCEDURE — 3078F DIAST BP <80 MM HG: CPT

## 2025-04-02 RX ORDER — BUSPIRONE HYDROCHLORIDE 5 MG/1
5 TABLET ORAL 3 TIMES DAILY
Qty: 270 TABLET | Refills: 3 | Status: SHIPPED | OUTPATIENT
Start: 2025-04-02

## 2025-04-02 RX ORDER — VALSARTAN 160 MG/1
160 TABLET ORAL DAILY
Qty: 90 TABLET | Refills: 3 | Status: SHIPPED | OUTPATIENT
Start: 2025-04-02

## 2025-04-02 RX ORDER — ZOLPIDEM TARTRATE 5 MG/1
5 TABLET ORAL NIGHTLY PRN
Qty: 15 TABLET | Refills: 0 | Status: SHIPPED | OUTPATIENT
Start: 2025-04-02 | End: 2025-07-01

## 2025-04-02 RX ORDER — SUMATRIPTAN 50 MG/1
50 TABLET, FILM COATED ORAL
Qty: 10 TABLET | Refills: 11 | Status: SHIPPED | OUTPATIENT
Start: 2025-04-02

## 2025-04-02 RX ORDER — HYDROXYZINE HYDROCHLORIDE 50 MG/1
50 TABLET, FILM COATED ORAL 3 TIMES DAILY PRN
Qty: 30 TABLET | Refills: 3 | Status: SHIPPED | OUTPATIENT
Start: 2025-04-02 | End: 2025-04-25

## 2025-04-02 ASSESSMENT — ENCOUNTER SYMPTOMS: HEADACHES: 1

## 2025-04-02 ASSESSMENT — PATIENT HEALTH QUESTIONNAIRE - PHQ9: CLINICAL INTERPRETATION OF PHQ2 SCORE: 0

## 2025-04-02 ASSESSMENT — FIBROSIS 4 INDEX: FIB4 SCORE: 1.06

## 2025-04-02 NOTE — ASSESSMENT & PLAN NOTE
Chronic, ongoing reports relief with ambien as needed intermittently.  Last rx 15 tabs filled 9/27/24  Continue this. Ambien 5 mg nightly as needed    Obtained and reviewed patient utilization report from [facility] pharmacy database on 4/2/2025 4:05 PM  prior to writing prescription for controlled substance II, III or IV per [place] bill . Based on assessment of the report,my physical exam if necessary and the patient's health problem, the prescription is medically necessary.

## 2025-04-02 NOTE — ASSESSMENT & PLAN NOTE
Chronic, unstable.   Home sleep test 1/11/24 in media  Impression:      This study shows evidence of:      1. Mild obstructive sleep apnea with PAT apnea hypopnea index(pAHI) of 14.4 per hour.  PAT respiratory disturbance index (pRDI) was 14.8 per hour. These findings are based on 7 channels recording of PAT signal with sleep staging, heart rate, pulse oximetry, actigraphy, body position, snoring and respiratory movement.      2. Oxygenation O2 Sat. mean O2 sat was 90%,  nathaniel was 77%,  and maximum O2 at 99 %. O2 sat was at or  below 88% for 113.9 min of evaluation time. Oxygen Desaturation (>=4%) Index was 9.1/hr. AVG HR was 60 BPM.     CPAP ordered. F/u with sleep medicine.

## 2025-04-02 NOTE — LETTER
[facility]  [address]  [place] [address]     April 2, 2025    Patient: Jil Martinez   YOB: 1970   Date of Visit: 4/2/2025       To Whom It May Concern:    Jil Martinez was seen and treated in our department on 4/2/2025.     Sincerely,     DALIA Pineda.

## 2025-04-02 NOTE — PROGRESS NOTES
Verbal consent was acquired by the patient to use [employer] ambient listening note generation during this visit     Subjective:     CC: Diagnoses of Trigger finger of right thumb, Intractable episodic cluster headache, Primary insomnia, Primary hypertension, Screening for colorectal cancer, Encounter for screening mammogram for breast cancer, CORINE (obstructive sleep apnea), Need for vaccination, Need for hepatitis C screening test, Screening for cardiovascular condition, Wellness examination, Vitamin D deficiency, Thyroid disorder screen, Screening for deficiency anemia, and Swelling of right little finger were pertinent to this visit.    HPI:   Jil presents today with    History of Present Illness  The patient is a 54-year-old female who presents for evaluation of right little finger swelling, right thumb trigger finger, cluster headaches, sleep apnea, and medication management.    She reports an episode of vomiting bile last Monday, which lasted approximately 4.5 hours. She also experienced a cluster headache on Friday night, the first in over a year, which was managed with sumatriptan. However, she continues to experience soreness in her eye muscles, particularly when blinking or moving her eyes. She is requesting refills of her sumatriptan. She was on valsartan and then when she started having the cluster headaches, Dr. Lema added verapamil ER.    Upon awakening this morning, she noticed swelling in her right little finger, extending from the second knuckle upwards. The swelling is not associated with pain or joint discomfort but is accompanied by a throbbing sensation. She attempted to alleviate the swelling with ice, but this resulted in an increase in size. The swelling has remained consistent throughout the day. She has not applied any other treatments to the affected area.    Additionally, she reports a 4 to 5-month history of trigger finger in her right thumb, which is causing significant  discomfort and difficulty in straightening the thumb.    She has been diagnosed with mild obstructive sleep apnea and is interested in trying CPAP therapy.    She is seeking a refill of her Ambien prescription, which she uses intermittently for sleep disturbances. Her last refill was in October 2024.    She is due for colon cancer screening and mammogram. She had blood work done in December 2024 because she had some weird episodes. She came here first and they said that her EKG was not good so they sent her down to emergency and she spent almost all day in the emergency. They said that there was a slight blockage and he was not really too worried about it and sent her home. She still smokes. She tried Chantix, but it was not a good fit for her as she had nightmares on it.    SOCIAL HISTORY  She still smokes.    FAMILY HISTORY  Her mother has stage III kidney disease, atrial fibrillation, emphysema, COPD, high blood pressure, heart murmur, and a chronic wet cough. Her father is getting Alzheimer's. Her stepmother is getting dementia.    MEDICATIONS  Current: Ambien, sumatriptan, valsartan, verapamil ER, hydrochlorothiazide, BuSpar    Current Outpatient Medications   Medication Sig Dispense Refill    SUMAtriptan (IMITREX) 50 MG Tab Take 1 Tablet by mouth one time as needed for Migraine for up to 1 dose. 10 Tablet 11    zolpidem (AMBIEN) 5 MG Tab Take 1 Tablet by mouth at bedtime as needed for Sleep for up to 90 days. Indications: Trouble Sleeping 15 Tablet 0    valsartan (DIOVAN) 160 MG Tab Take 1 Tablet by mouth every day. 90 Tablet 3    hydrochlorothiazide (MICROZIDE) 12.5 MG capsule Take 1 Capsule by mouth every day. 100 Capsule 3    verapamil ER (CALAN SR) 120 MG CAPSULE SR 24 HR Take 1 Capsule by mouth every day. 90 Capsule 3    busPIRone (BUSPAR) 5 MG tablet Take 1 Tablet by mouth 3 times a day. 270 Tablet 3    hydrOXYzine HCl (ATARAX) 50 MG Tab Take 1 Tablet by mouth 3 times a day as needed for Anxiety. 30  "Tablet 3     No current facility-administered medications for this visit.       Problem   Trigger Finger of Right Thumb   Krishan (Obstructive Sleep Apnea)   Cluster Headaches   Primary Insomnia   Cat Bite (Resolved)       ROS:  Review of Systems   Musculoskeletal:         5th finger right hand swelling  1st finger right hand trigger finger, pain with extension   Neurological:  Positive for headaches.   All other systems reviewed and are negative.      Objective:     Exam:  /62 (BP Location: Right arm, Patient Position: Sitting, BP Cuff Size: Adult)   Pulse 72   Temp 36.4 °C (97.6 °F) (Temporal)   Resp 20   Ht 1.6 m (5' 3\")   Wt 57.6 kg (127 lb)   SpO2 97%   BMI 22.50 kg/m²  Body mass index is 22.5 kg/m².    Physical Exam  Vitals reviewed.   Constitutional:       General: She is not in acute distress.     Appearance: Normal appearance. She is not ill-appearing.   HENT:      Head: Normocephalic and atraumatic.   Cardiovascular:      Rate and Rhythm: Normal rate and regular rhythm.      Pulses: Normal pulses.      Heart sounds: Normal heart sounds.   Pulmonary:      Effort: Pulmonary effort is normal. No respiratory distress.      Breath sounds: Normal breath sounds. No wheezing.   Skin:     General: Skin is warm and dry.      Findings: No rash.   Neurological:      General: No focal deficit present.      Mental Status: She is alert and oriented to person, place, and time.   Psychiatric:         Mood and Affect: Mood normal.         Behavior: Behavior normal.         Assessment & Plan:     54 y.o. female with the following -     Assessment & Plan  1. Right little finger swelling.  The swelling does not appear to be indicative of an infection or cellulitis. Blood flow appears normal, thus ruling out compartment syndrome. The cause of the swelling remains undetermined. Voltaren gel has been recommended for application on the hand or joints of the hand four times daily to help with the swelling.    2. Right " thumb trigger finger.  The thumb appears to be caught, preventing normal function. A referral to a hand specialist has been made for further evaluation and potential treatment options, including steroid injections or surgery.    3. Cluster headaches.  A prescription for sumatriptan has been provided.    4. Mild obstructive sleep apnea.  A new referral to sleep medicine has been initiated for further evaluation and management.    5. Medication management.  A controlled substance agreement will be established for the Ambien prescription. A urine drug screen will be conducted as part of this agreement. A prescription for Ambien has been provided.    6. Health maintenance.  She is due for colon cancer screening and mammogram. She is due for a pneumonia vaccine. She is advised to quit smoking. A Cologuard test has been ordered. A fasting blood work has been ordered to recheck her red blood cells, potassium levels, and A1c. A pneumonia vaccine will be administered today.    Follow-up  The patient will follow up in 3 months.    Problem List Items Addressed This Visit       Hypertension    Relevant Medications    valsartan (DIOVAN) 160 MG Tab    Other Relevant Orders    Comp Metabolic Panel    Primary insomnia    Chronic, ongoing reports relief with ambien as needed intermittently.  Last rx 15 tabs filled 9/27/24  Continue this. Ambien 5 mg nightly as needed    Obtained and reviewed patient utilization report from [facility] pharmacy database on 4/2/2025 4:05 PM  prior to writing prescription for controlled substance II, III or IV per [place] bill . Based on assessment of the report,my physical exam if necessary and the patient's health problem, the prescription is medically necessary.            Relevant Medications    zolpidem (AMBIEN) 5 MG Tab    Other Relevant Orders    Controlled Substance Treatment Agreement    PAIN MANAGEMENT PANEL, SCRN W/ RFLX TO QNT    Cluster headaches    Chronic, intermittent. Continue  sumatriptan 50 mg as needed.         Relevant Medications    SUMAtriptan (IMITREX) 50 MG Tab    Trigger finger of right thumb    Chronic, ongoing. Reports ongoing for 4-5 months with difficulty straightening DIP joint.          Relevant Orders    Referral to Hand Surgery    CORINE (obstructive sleep apnea)    Chronic, unstable.   Home sleep test 1/11/24 in media  Impression:      This study shows evidence of:      1. Mild obstructive sleep apnea with PAT apnea hypopnea index(pAHI) of 14.4 per hour.  PAT respiratory disturbance index (pRDI) was 14.8 per hour. These findings are based on 7 channels recording of PAT signal with sleep staging, heart rate, pulse oximetry, actigraphy, body position, snoring and respiratory movement.      2. Oxygenation O2 Sat. mean O2 sat was 90%,  nathaniel was 77%,  and maximum O2 at 99 %. O2 sat was at or  below 88% for 113.9 min of evaluation time. Oxygen Desaturation (>=4%) Index was 9.1/hr. AVG HR was 60 BPM.     CPAP ordered. F/u with sleep medicine.         Relevant Orders    DME CPAP    Referral to Pulmonary and Sleep Medicine     Other Visit Diagnoses         Screening for colorectal cancer        Relevant Orders    Cologuard® colon cancer screening      Encounter for screening mammogram for breast cancer        Relevant Orders    MA-SCREENING MAMMO BILAT W/TOMOSYNTHESIS W/CAD      Need for vaccination        Relevant Orders    Pneumococcal Conjugate Vaccine 20-Valent (6 wks+)      Need for hepatitis C screening test        Relevant Orders    HEP C VIRUS ANTIBODY      Screening for cardiovascular condition        Relevant Orders    HEMOGLOBIN A1C    Comp Metabolic Panel    HEP C VIRUS ANTIBODY      Wellness examination        Relevant Orders    HEMOGLOBIN A1C    VITAMIN D,25 HYDROXY (DEFICIENCY)    TSH    Comp Metabolic Panel    CBC WITHOUT DIFFERENTIAL    Lipid Profile    HEP C VIRUS ANTIBODY      Vitamin D deficiency        Relevant Orders    VITAMIN D,25 HYDROXY (DEFICIENCY)       Thyroid disorder screen        Relevant Orders    TSH      Screening for deficiency anemia        Relevant Orders    CBC WITHOUT DIFFERENTIAL      Swelling of right little finger        acute, noticed today. some dull pain. no injury/trauma          Patient was educated in proper administration of medication(s) ordered today including safety, possible SE, risks, benefits, rationale and alternatives to therapy.   Supportive care, differential diagnoses, and indications for immediate follow-up discussed with patient.    Pathogenesis of diagnosis discussed including typical length and natural progression.    Instructed to return to clinic or nearest emergency department for any change in condition, further concerns, or worsening of symptoms.  Patient states understanding of the plan of care and discharge instructions.    Return in about 6 months (around 10/2/2025) for Labs.    Please note that this dictation was created using voice recognition software. I have made every reasonable attempt to correct obvious errors, but I expect that there are errors of grammar and possibly content that I did not discover before finalizing the note.

## 2025-04-04 ENCOUNTER — RESULTS FOLLOW-UP (OUTPATIENT)
Dept: MEDICAL GROUP | Facility: PHYSICIAN GROUP | Age: 55
End: 2025-04-04

## 2025-04-04 LAB
AMPHET CTO UR CFM-MCNC: NEGATIVE NG/ML
BARBITURATES CTO UR CFM-MCNC: NEGATIVE NG/ML
BENZODIAZ CTO UR CFM-MCNC: NEGATIVE NG/ML
BUPRENORPHINE UR-MCNC: NEGATIVE NG/ML
CANNABINOIDS CTO UR CFM-MCNC: NEGATIVE NG/ML
CARISOPRODOL UR-MCNC: NEGATIVE NG/ML
COCAINE CTO UR CFM-MCNC: NEGATIVE NG/ML
CREAT UR-MCNC: 62.5 MG/DL (ref 20–400)
DRUG SCREEN COMMENT UR-IMP: NORMAL
ETHYL GLUCURONIDE UR QL SCN: NORMAL NG/ML
FENTANYL UR-MCNC: NEGATIVE NG/ML
MEPERIDINE CTO UR SCN-MCNC: NEGATIVE NG/ML
METHADONE CTO UR CFM-MCNC: NEGATIVE NG/ML
OPIATES UR QL SCN: NEGATIVE NG/ML
OXYCDOXYM URSCRN 2005102: NEGATIVE NG/ML
PCP CTO UR CFM-MCNC: NEGATIVE NG/ML
PROPOXYPH CTO UR CFM-MCNC: NEGATIVE NG/ML
TAPENTADOL UR-MCNC: NEGATIVE NG/ML
TRAMADOL CTO UR SCN-MCNC: NEGATIVE NG/ML
ZOLPIDEM UR-MCNC: NEGATIVE NG/ML

## 2025-04-04 NOTE — Clinical Note
REFERRAL APPROVAL NOTICE         Sent on April 4, 2025                   Katerina Martinez  [address] [place] [address]                   Dear Ms. Martinez,    After a careful review of the medical information and benefit coverage, [facility] has processed your referral. See below for additional details.    If applicable, you must be actively enrolled with your insurance for coverage of the authorized service. If you have any questions regarding your coverage, please contact your insurance directly.    REFERRAL INFORMATION   Referral #:  [id number]  Referred-To Department    Referred-By Provider:  Pulmonary and Sleep Medicine    ANANDA Pineda   Pulmonary/sleep [facility]      [address]  [place] [address]  [phone] [address]  [place] [id number] [phone]    Referral Start Date:  04/02/2025  Referral End Date:   04/02/2026           SCHEDULING  If you do not already have an appointment, please call [phone] to make an appointment.   MORE INFORMATION  As a reminder, [facility] ownership has changed, meaning this location is now owned and operated by [facility]. As such, we want to clarify that our patients should expect to receive two separate bills for the services received at [facility] - one representing the [facility] facility fees as the owner of the establishment, and the other to represent the physician's services and subsequent fees. You can speak with your insurance carrier for a pricing estimate by calling the customer service number on the back of your card and ask about charges for a hospital outpatient visit.  If you do not already have a eBillme account, sign up at: [email or web address]  You can access your medical information, make appointments, see lab results, billing  information, and more.  If you have questions regarding this referral, please contact  the [facility] department at:             [phone]. Monday - Friday 7:30AM - 5:00PM.      Sincerely,  [facility]

## 2025-04-07 ENCOUNTER — PATIENT MESSAGE (OUTPATIENT)
Dept: MEDICAL GROUP | Facility: PHYSICIAN GROUP | Age: 55
End: 2025-04-07
Payer: COMMERCIAL

## 2025-04-08 NOTE — Clinical Note
REFERRAL APPROVAL NOTICE         Sent on April 8, 2025                   Katerina Martinez  [address] [address]                   Dear Ms. Martinez,    After a careful review of the medical information and benefit coverage, [facility] has processed your referral. See below for additional details.    If applicable, you must be actively enrolled with your insurance for coverage of the authorized service. If you have any questions regarding your coverage, please contact your insurance directly.    REFERRAL INFORMATION   Referral #:  [id number]  Referred-To Department    Referred-By Provider:  Hand Surgery    ANANDA Pineda Main [facility] (joint)      [address]  [place] [place] [id number] [phone] [address] [place] [address]  [phone]    Referral Start Date:  04/02/2025  Referral End Date:   04/02/2026             SCHEDULING  If you do not already have an appointment, please call [phone] to make an appointment.     MORE INFORMATION  If you do not already have a [email or web address] account, sign up at: [email or web address]  You can access your medical information, make appointments, see lab results, billing information, and more.  If you have questions regarding this referral, please contact  the [facility] Referrals department at:             [phone]. Monday - Friday 8:00AM - 5:00PM.     Sincerely,    [facility]

## 2025-04-09 LAB
ETHYL GLUCURONIDE UR CFM-MCNC: NORMAL NG/ML
ETHYL SULFATE UR CFM-MCNC: NORMAL NG/ML

## 2025-04-23 PROBLEM — M65.351 TRIGGER LITTLE FINGER OF RIGHT HAND: Status: ACTIVE | Noted: 2025-04-23

## 2025-04-23 PROBLEM — S46.211A BICEPS STRAIN, RIGHT, INITIAL ENCOUNTER: Status: ACTIVE | Noted: 2025-04-23

## 2025-04-23 PROBLEM — M65.311 TRIGGER THUMB OF RIGHT HAND: Status: ACTIVE | Noted: 2025-04-23

## 2025-04-25 ENCOUNTER — OFFICE VISIT (OUTPATIENT)
Dept: URGENT CARE | Facility: PHYSICIAN GROUP | Age: 55
End: 2025-04-25
Payer: COMMERCIAL

## 2025-04-25 ENCOUNTER — PATIENT MESSAGE (OUTPATIENT)
Dept: MEDICAL GROUP | Facility: PHYSICIAN GROUP | Age: 55
End: 2025-04-25
Payer: COMMERCIAL

## 2025-04-25 VITALS
HEART RATE: 82 BPM | TEMPERATURE: 98.6 F | RESPIRATION RATE: 14 BRPM | BODY MASS INDEX: 23.34 KG/M2 | DIASTOLIC BLOOD PRESSURE: 84 MMHG | HEIGHT: 63 IN | SYSTOLIC BLOOD PRESSURE: 118 MMHG | WEIGHT: 131.7 LBS | OXYGEN SATURATION: 98 %

## 2025-04-25 DIAGNOSIS — L29.9 ITCH OF SKIN: ICD-10-CM

## 2025-04-25 DIAGNOSIS — T78.40XA ALLERGIC REACTION, INITIAL ENCOUNTER: ICD-10-CM

## 2025-04-25 PROCEDURE — 99213 OFFICE O/P EST LOW 20 MIN: CPT | Performed by: PHYSICIAN ASSISTANT

## 2025-04-25 PROCEDURE — 3074F SYST BP LT 130 MM HG: CPT | Performed by: PHYSICIAN ASSISTANT

## 2025-04-25 PROCEDURE — 1126F AMNT PAIN NOTED NONE PRSNT: CPT | Performed by: PHYSICIAN ASSISTANT

## 2025-04-25 PROCEDURE — 3079F DIAST BP 80-89 MM HG: CPT | Performed by: PHYSICIAN ASSISTANT

## 2025-04-25 RX ORDER — HYDROXYZINE HYDROCHLORIDE 25 MG/1
25 TABLET, FILM COATED ORAL 3 TIMES DAILY PRN
Qty: 30 TABLET | Refills: 0 | Status: SHIPPED | OUTPATIENT
Start: 2025-04-25

## 2025-04-25 ASSESSMENT — PAIN SCALES - GENERAL: PAINLEVEL_OUTOF10: NO PAIN

## 2025-04-25 ASSESSMENT — ENCOUNTER SYMPTOMS: RESPIRATORY NEGATIVE: 1

## 2025-04-25 ASSESSMENT — FIBROSIS 4 INDEX: FIB4 SCORE: 1.06

## 2025-04-25 NOTE — PROGRESS NOTES
"  Subjective:     Jil Martinez  is a 54 y.o. female who presents for Rash (Allergic reaction patient had 2 Cortizone shots yesterday bilateral hand she's getting reaction to shots )       She presents today with itching skin ongoing over the last 2 days.  Symptoms began after she underwent cortisone steroid injection of her right pinky and right thumb.  Has had previous allergic reaction to steroid medications, previous reactions were similar to when she is currently experiencing.  No swelling of the lips, tongue, throat, no difficulties with breathing or shortness of breath.  Has not taken any medications thus far for symptom support.       Review of Systems   HENT: Negative.     Respiratory: Negative.     Skin:  Positive for itching. Negative for rash.      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   Constitutional:       General: She is not in acute distress.     Appearance: She is not ill-appearing or toxic-appearing.   HENT:      Head: Normocephalic.      Nose: No rhinorrhea.      Mouth/Throat:      Mouth: Mucous membranes are moist.      Pharynx: No " oropharyngeal exudate or posterior oropharyngeal erythema.      Comments: No swelling of the lips, tongue, posterior oropharynx; posterior oropharynx patent.  No tonsillar swelling, bilaterally.  No soft tissue swelling of the sublingual mucosa, no swelling of the soft or hard palate, no unilarteral oral pharynx swelling, no uvular deviation.  Eyes:      General: No scleral icterus.     Conjunctiva/sclera: Conjunctivae normal.   Pulmonary:      Effort: Pulmonary effort is normal. No respiratory distress.      Breath sounds: No stridor.   Musculoskeletal:      Cervical back: Neck supple.   Skin:     Comments: To injection puncture wounds over the palm of the right hand from previous steroid injections.  Dry skin seen on bilateral hands.  No overt swelling.   Neurological:      Mental Status: She is alert and oriented to person, place, and time.   Psychiatric:         Mood and Affect: Mood normal.         Behavior: Behavior normal.         Thought Content: Thought content normal.         Judgment: Judgment normal.             Diagnostic testing: None    Assessment/Plan:     Encounter Diagnoses   Name Primary?    Allergic reaction, initial encounter     Itch of skin        Plan for care for today's complaint includes Started the patient on hydroxyzine for allergic reaction secondary to cortisone injections that were performed 2 days ago.  Instructed patient to also moisturize frequently throughout the day due to dry skin seen during today's examination.  No signs of angioedema or anaphylaxis.  Vital signs were stable during today's office visit, patient was overall well-appearing. Continue to monitor symptoms and return to urgent care or follow-up with primary care provider if symptoms remain ongoing.  Follow-up in the emergency department if symptoms become severe, ER precautions discussed in office today.  Prescription for hydroxyzine provided.    See AVS Instructions below for written guidance provided to patient on  after-visit management and care in addition to our verbal discussion during the visit.    Please note that this dictation was created using voice recognition software. I have attempted to correct all errors, but there may be sound-alike, spelling, grammar and possibly content errors that I did not discover before finalizing the note.    Reg Rust PA-C

## 2025-04-25 NOTE — LETTER
[facility] [place]  [address]  [place] [address]     April 25, 2025    Patient: Jil Martinez   YOB: 1970   Date of Visit: 4/25/2025       To Whom It May Concern:    Jil Martinez was seen and treated in our department on 4/25/2025.  Please excuse from work today    Sincerely,     Reg Rust P.A.-C.

## 2025-05-15 LAB — NONINV COLON CA DNA+OCC BLD SCRN STL QL: NEGATIVE

## 2025-05-16 ENCOUNTER — RESULTS FOLLOW-UP (OUTPATIENT)
Dept: MEDICAL GROUP | Facility: PHYSICIAN GROUP | Age: 55
End: 2025-05-16

## 2025-07-17 ENCOUNTER — HOSPITAL ENCOUNTER (OUTPATIENT)
Dept: LAB | Facility: MEDICAL CENTER | Age: 55
End: 2025-07-17
Payer: COMMERCIAL

## 2025-07-17 DIAGNOSIS — Z13.0 SCREENING FOR DEFICIENCY ANEMIA: ICD-10-CM

## 2025-07-17 DIAGNOSIS — Z11.59 NEED FOR HEPATITIS C SCREENING TEST: ICD-10-CM

## 2025-07-17 DIAGNOSIS — Z13.6 SCREENING FOR CARDIOVASCULAR CONDITION: ICD-10-CM

## 2025-07-17 DIAGNOSIS — I10 PRIMARY HYPERTENSION: ICD-10-CM

## 2025-07-17 DIAGNOSIS — Z00.00 WELLNESS EXAMINATION: ICD-10-CM

## 2025-07-17 DIAGNOSIS — Z13.29 THYROID DISORDER SCREEN: ICD-10-CM

## 2025-07-17 DIAGNOSIS — E55.9 VITAMIN D DEFICIENCY: ICD-10-CM

## 2025-07-17 LAB
25(OH)D3 SERPL-MCNC: 47 NG/ML (ref 30–100)
ALBUMIN SERPL BCP-MCNC: 4.3 G/DL (ref 3.2–4.9)
ALBUMIN/GLOB SERPL: 1.5 G/DL
ALP SERPL-CCNC: 62 U/L (ref 30–99)
ALT SERPL-CCNC: 22 U/L (ref 2–50)
ANION GAP SERPL CALC-SCNC: 13 MMOL/L (ref 7–16)
AST SERPL-CCNC: 21 U/L (ref 12–45)
BILIRUB SERPL-MCNC: 0.5 MG/DL (ref 0.1–1.5)
BUN SERPL-MCNC: 9 MG/DL (ref 8–22)
CALCIUM ALBUM COR SERPL-MCNC: 9.2 MG/DL (ref 8.5–10.5)
CALCIUM SERPL-MCNC: 9.4 MG/DL (ref 8.5–10.5)
CHLORIDE SERPL-SCNC: 100 MMOL/L (ref 96–112)
CHOLEST SERPL-MCNC: 208 MG/DL (ref 100–199)
CO2 SERPL-SCNC: 20 MMOL/L (ref 20–33)
CREAT SERPL-MCNC: 0.67 MG/DL (ref 0.5–1.4)
ERYTHROCYTE [DISTWIDTH] IN BLOOD BY AUTOMATED COUNT: 44.4 FL (ref 35.9–50)
EST. AVERAGE GLUCOSE BLD GHB EST-MCNC: 111 MG/DL
GFR SERPLBLD CREATININE-BSD FMLA CKD-EPI: 103 ML/MIN/1.73 M 2
GLOBULIN SER CALC-MCNC: 2.8 G/DL (ref 1.9–3.5)
GLUCOSE SERPL-MCNC: 88 MG/DL (ref 65–99)
HBA1C MFR BLD: 5.5 % (ref 4–5.6)
HCT VFR BLD AUTO: 42.9 % (ref 37–47)
HCV AB SER QL: NORMAL
HDLC SERPL-MCNC: 48 MG/DL
HGB BLD-MCNC: 14.9 G/DL (ref 12–16)
LDLC SERPL CALC-MCNC: 125 MG/DL
MCH RBC QN AUTO: 34.5 PG (ref 27–33)
MCHC RBC AUTO-ENTMCNC: 34.7 G/DL (ref 32.2–35.5)
MCV RBC AUTO: 99.3 FL (ref 81.4–97.8)
PLATELET # BLD AUTO: 229 K/UL (ref 164–446)
PMV BLD AUTO: 10.9 FL (ref 9–12.9)
POTASSIUM SERPL-SCNC: 3.9 MMOL/L (ref 3.6–5.5)
PROT SERPL-MCNC: 7.1 G/DL (ref 6–8.2)
RBC # BLD AUTO: 4.32 M/UL (ref 4.2–5.4)
SODIUM SERPL-SCNC: 133 MMOL/L (ref 135–145)
TRIGL SERPL-MCNC: 174 MG/DL (ref 0–149)
TSH SERPL-ACNC: 0.9 UIU/ML (ref 0.38–5.33)
WBC # BLD AUTO: 5.8 K/UL (ref 4.8–10.8)

## 2025-07-17 PROCEDURE — 83036 HEMOGLOBIN GLYCOSYLATED A1C: CPT

## 2025-07-17 PROCEDURE — 86803 HEPATITIS C AB TEST: CPT

## 2025-07-17 PROCEDURE — 84443 ASSAY THYROID STIM HORMONE: CPT

## 2025-07-17 PROCEDURE — 82306 VITAMIN D 25 HYDROXY: CPT

## 2025-07-17 PROCEDURE — 85027 COMPLETE CBC AUTOMATED: CPT

## 2025-07-17 PROCEDURE — 80053 COMPREHEN METABOLIC PANEL: CPT

## 2025-07-17 PROCEDURE — 80061 LIPID PANEL: CPT

## 2025-07-17 PROCEDURE — 36415 COLL VENOUS BLD VENIPUNCTURE: CPT

## 2025-07-22 ENCOUNTER — APPOINTMENT (OUTPATIENT)
Dept: SLEEP MEDICINE | Facility: MEDICAL CENTER | Age: 55
End: 2025-07-22
Payer: COMMERCIAL

## 2025-08-14 ENCOUNTER — OFFICE VISIT (OUTPATIENT)
Dept: MEDICAL GROUP | Facility: PHYSICIAN GROUP | Age: 55
End: 2025-08-14
Payer: COMMERCIAL

## 2025-08-14 VITALS
HEIGHT: 63 IN | RESPIRATION RATE: 16 BRPM | SYSTOLIC BLOOD PRESSURE: 96 MMHG | BODY MASS INDEX: 23.32 KG/M2 | HEART RATE: 81 BPM | WEIGHT: 131.6 LBS | TEMPERATURE: 97.2 F | DIASTOLIC BLOOD PRESSURE: 62 MMHG | OXYGEN SATURATION: 93 %

## 2025-08-14 DIAGNOSIS — J01.11 ACUTE RECURRENT FRONTAL SINUSITIS: Primary | ICD-10-CM

## 2025-08-14 DIAGNOSIS — Z23 NEED FOR VACCINATION: ICD-10-CM

## 2025-08-14 DIAGNOSIS — I10 PRIMARY HYPERTENSION: ICD-10-CM

## 2025-08-14 DIAGNOSIS — G44.011 INTRACTABLE EPISODIC CLUSTER HEADACHE: ICD-10-CM

## 2025-08-14 PROCEDURE — 90471 IMMUNIZATION ADMIN: CPT

## 2025-08-14 PROCEDURE — 90677 PCV20 VACCINE IM: CPT

## 2025-08-14 PROCEDURE — 3074F SYST BP LT 130 MM HG: CPT

## 2025-08-14 PROCEDURE — 99214 OFFICE O/P EST MOD 30 MIN: CPT | Mod: 25

## 2025-08-14 PROCEDURE — 3078F DIAST BP <80 MM HG: CPT

## 2025-08-14 RX ORDER — FLUTICASONE PROPIONATE 50 MCG
2 SPRAY, SUSPENSION (ML) NASAL DAILY
Qty: 9.9 ML | Refills: 0 | Status: SHIPPED | OUTPATIENT
Start: 2025-08-14

## 2025-08-14 RX ORDER — DOXYCYCLINE 100 MG/1
100 CAPSULE ORAL 2 TIMES DAILY
Qty: 10 CAPSULE | Refills: 0 | Status: SHIPPED | OUTPATIENT
Start: 2025-08-14 | End: 2025-08-19

## 2025-08-14 RX ORDER — ECHINACEA PURPUREA EXTRACT 125 MG
2 TABLET ORAL
Qty: 30 ML | Refills: 0 | Status: SHIPPED | OUTPATIENT
Start: 2025-08-14

## 2025-08-14 RX ORDER — IBUPROFEN 600 MG/1
600 TABLET, FILM COATED ORAL EVERY 6 HOURS PRN
Qty: 30 TABLET | Refills: 0 | Status: SHIPPED | OUTPATIENT
Start: 2025-08-14

## 2025-08-14 ASSESSMENT — FIBROSIS 4 INDEX: FIB4 SCORE: 1.09
